# Patient Record
Sex: FEMALE | Race: BLACK OR AFRICAN AMERICAN | NOT HISPANIC OR LATINO | Employment: OTHER | URBAN - METROPOLITAN AREA
[De-identification: names, ages, dates, MRNs, and addresses within clinical notes are randomized per-mention and may not be internally consistent; named-entity substitution may affect disease eponyms.]

---

## 2017-05-04 DIAGNOSIS — Z99.2 ESRD (END STAGE RENAL DISEASE) ON DIALYSIS: Primary | ICD-10-CM

## 2017-05-04 DIAGNOSIS — N18.6 ESRD (END STAGE RENAL DISEASE) ON DIALYSIS: Primary | ICD-10-CM

## 2017-05-10 ENCOUNTER — INITIAL CONSULT (OUTPATIENT)
Dept: VASCULAR SURGERY | Facility: CLINIC | Age: 37
End: 2017-05-10
Payer: MEDICARE

## 2017-05-10 ENCOUNTER — HOSPITAL ENCOUNTER (OUTPATIENT)
Dept: VASCULAR SURGERY | Facility: CLINIC | Age: 37
Discharge: HOME | End: 2017-05-10
Attending: SURGERY
Payer: MEDICARE

## 2017-05-10 VITALS
WEIGHT: 158 LBS | HEIGHT: 67 IN | HEART RATE: 89 BPM | BODY MASS INDEX: 24.8 KG/M2 | DIASTOLIC BLOOD PRESSURE: 77 MMHG | TEMPERATURE: 98 F | SYSTOLIC BLOOD PRESSURE: 107 MMHG

## 2017-05-10 DIAGNOSIS — N18.6 ESRD (END STAGE RENAL DISEASE) ON DIALYSIS: ICD-10-CM

## 2017-05-10 DIAGNOSIS — Z99.2 CKD (CHRONIC KIDNEY DISEASE) STAGE V REQUIRING CHRONIC DIALYSIS: ICD-10-CM

## 2017-05-10 DIAGNOSIS — Z99.2 ESRD (END STAGE RENAL DISEASE) ON DIALYSIS: ICD-10-CM

## 2017-05-10 DIAGNOSIS — N18.6 CKD (CHRONIC KIDNEY DISEASE) STAGE V REQUIRING CHRONIC DIALYSIS: ICD-10-CM

## 2017-05-10 DIAGNOSIS — Z01.818 PRE-OP EVALUATION: Primary | ICD-10-CM

## 2017-05-10 PROCEDURE — 99204 OFFICE O/P NEW MOD 45 MIN: CPT | Mod: S$PBB,,, | Performed by: SURGERY

## 2017-05-10 PROCEDURE — 99999 PR PBB SHADOW E&M-EST. PATIENT-LVL III: CPT | Mod: PBBFAC,,, | Performed by: SURGERY

## 2017-05-10 PROCEDURE — 99213 OFFICE O/P EST LOW 20 MIN: CPT | Mod: PBBFAC | Performed by: SURGERY

## 2017-05-10 PROCEDURE — G0365 VESSEL MAPPING HEMO ACCESS: HCPCS | Mod: 26,S$PBB,, | Performed by: SURGERY

## 2017-05-10 RX ORDER — SODIUM BICARBONATE 650 MG/1
1300 TABLET ORAL 3 TIMES DAILY
Refills: 0 | COMMUNITY
Start: 2017-05-08 | End: 2020-10-28

## 2017-05-10 RX ORDER — SODIUM CHLORIDE 9 MG/ML
INJECTION, SOLUTION INTRAVENOUS CONTINUOUS
Status: CANCELLED | OUTPATIENT
Start: 2017-05-10

## 2017-05-10 RX ORDER — AMLODIPINE BESYLATE 10 MG/1
TABLET ORAL
COMMUNITY
Start: 2017-05-09 | End: 2020-10-28

## 2017-05-10 RX ORDER — CHOLECALCIFEROL (VITAMIN D3) 25 MCG
1000 TABLET ORAL NIGHTLY
COMMUNITY

## 2017-05-10 RX ORDER — CHLORHEXIDINE GLUCONATE ORAL RINSE 1.2 MG/ML
SOLUTION DENTAL
Refills: 0 | COMMUNITY
Start: 2017-05-08 | End: 2020-10-28

## 2017-05-10 RX ORDER — METHIMAZOLE 10 MG/1
TABLET ORAL
Refills: 0 | COMMUNITY
Start: 2017-05-08 | End: 2020-10-28

## 2017-05-10 RX ORDER — FERROUS SULFATE 325(65) MG
325 TABLET, DELAYED RELEASE (ENTERIC COATED) ORAL
COMMUNITY
End: 2020-10-28

## 2017-05-10 NOTE — LETTER
May 10, 2017      Mer Zuniga MD  [address] [place] [address] Blvd  [address]  [place] [address]           [facility] - Vascular Surgery  [address]  [place] [address]  Phone: [phone]  Fax: [phone]          Patient: Agueda Herrera   MR Number: [id number]   YOB: 1980   Date of Visit: 5/10/2017       Dear Dr. Mer Zuniga:    Thank you for referring Agueda Herrera to me for evaluation. Attached you will find relevant portions of my assessment and plan of care.    If you have questions, please do not hesitate to call me. I look forward to following Agueda Herrera along with you.    Sincerely,    Owen Fraser MD    Enclosure  CC:  No Recipients    If you would like to receive this communication electronically, please contact [email or web address] or [phone] to request more information on [employer] access.    For providers and/or their staff who would like to refer a patient to [facility], please contact us through our one-stop-shop provider referral line, [facility], at [phone].    If you feel you have received this communication in error or would no longer like to receive these types of communications, please e-mail [email or web address]

## 2017-05-10 NOTE — PROGRESS NOTES
Agueda Herrera  05/10/2017    HPI:  Patient is a 37 y.o. female with a h/o FSGS, stage V CKD / on HD since June 2013 who is here today for evaluation of AV access.  She's had 3 previous failed accesses.  Attending nursing school  R hand dominant    Has had previous access created at [facility]:  L rc avg, LUE AVG, RUE AVG - Dr Renner/Kierra at [facility]  LUE AVG worked for 3 yrs but required multiple interventions to keep it open  RUE AVG worked for ~ month before it occluded; no thrombectomy done    No MI/stroke  Tobacco use: denies    Renal is Dr Zuniga    Social History     Social History    Marital status: Single     Spouse name: N/A    Number of children: N/A    Years of education: N/A     Occupational History    Not on file.     Social History Main Topics    Smoking status: Not on file    Smokeless tobacco: Not on file    Alcohol use Not on file    Drug use: Not on file    Sexual activity: Not on file     Other Topics Concern    Not on file     Social History Narrative       Current Outpatient Prescriptions:     amlodipine (NORVASC) 10 MG tablet, , Disp: , Rfl:     chlorhexidine (PERIDEX) 0.12 % solution, , Disp: , Rfl: 0    ferrous sulfate 325 (65 FE) MG EC tablet, Take 325 mg by mouth 3 (three) times daily with meals., Disp: , Rfl:     methimazole (TAPAZOLE) 10 MG Tab, , Disp: , Rfl: 0    sodium bicarbonate 650 MG tablet, Take 1,300 mg by mouth 3 (three) times daily., Disp: , Rfl: 0    vitamin D (VITAMIN D3) 1000 units Tab, Take 1,000 Units by mouth once daily., Disp: , Rfl:     REVIEW OF SYSTEMS:  General: negative; ENT: negative; Allergy and Immunology: negative; Hematological and Lymphatic: Negative; Endocrine: negative; Respiratory: no cough, shortness of breath, or wheezing; Cardiovascular: no chest pain or dyspnea on exertion; Gastrointestinal: no abdominal pain/back, change in bowel habits, or bloody stools; Genito-Urinary: no dysuria, trouble voiding, or hematuria; Musculoskeletal:  negative  Neurological: no TIA or stroke symptoms; Psychiatric: no nervousness, anxiety or depression.    PHYSICAL EXAM:   Vitals:    05/10/17 1211   BP: 107/77   Pulse: 89   Temp: 97.7 °F (36.5 °C)     Right Arm BP - Sittin/85 (05/10/17 1215)  Left Arm BP - Sittin/77 (05/10/17 1215)      General appearance:  Alert, well-appearing, and in no distress.  Oriented to person, place, and time   Neurological: Normal speech, no focal findings noted; CN II - XII grossly intact           Musculoskeletal: Digits/nail without cyanosis/clubbing.  Normal muscle strength/tone.                 Neck: Supple, no significant adenopathy; thyroid is not enlarged                  No carotid bruit can be auscultated                Chest:  Clear to auscultation, no wheezes, rales or rhonchi, symmetric air entry     No use of accessory muscles             Cardiac: Normal rate and regular rhythm, S1 and S2 normal; PMI non-displaced          Abdomen: Soft, nontender, nondistended, no masses or organomegaly     No rebound tenderness noted; bowel sounds normal     Pulsatile aortic mass is not palpable.     No groin adenopathy      Extremities:   2+ brachial, 1+ R radial pulse    LAB RESULTS:  No results found for: K, CREATININE  No results found for: WBC, HCT, PLT  No results found for: HGBA1C  IMAGING:  Vein mapping:  R brachial vein: 2.9 - 3.1 mm  L brachial vein:  3.1 - 3.0 mm    Occluded cephalic veins  Basilic veins R 2.2 to 2.3 mm    IMP/PLAN:  37 y.o. female with h/o FSGS, stage V CKD / on HD since 2013 who is here today for evaluation of AV access.  She's had 3 previous failed accesses.  Best option - prior to considering a femoral AVG which she is also not interested in - creation of 1st stage R brachial artery/vein AVF; would need a transposition in 2-3 months later. Check R basilic vein after regional block.  Plan for 17    Owen Fraser MD FACS  Vascular/Endovascular Surgery

## 2017-05-19 ENCOUNTER — TELEPHONE (OUTPATIENT)
Dept: VASCULAR SURGERY | Facility: CLINIC | Age: 37
End: 2017-05-19

## 2017-05-19 NOTE — TELEPHONE ENCOUNTER
Agueda Sharon notified that her surgery is scheduled for 1020am on 5/22/17. she needs to arrive to the 2nd floor DOSC in the hospital @ 830am. she should not eat or drink anything after midnight. Agueda Herrera verbalize understanding.

## 2017-05-22 ENCOUNTER — ANESTHESIA (OUTPATIENT)
Dept: SURGERY | Facility: HOSPITAL | Age: 37
End: 2017-05-22
Payer: MEDICARE

## 2017-05-22 ENCOUNTER — SURGERY (OUTPATIENT)
Age: 37
End: 2017-05-22

## 2017-05-22 ENCOUNTER — ANESTHESIA EVENT (OUTPATIENT)
Dept: SURGERY | Facility: HOSPITAL | Age: 37
End: 2017-05-22
Payer: MEDICARE

## 2017-05-22 ENCOUNTER — HOSPITAL ENCOUNTER (OUTPATIENT)
Facility: HOSPITAL | Age: 37
Discharge: HOME | End: 2017-05-22
Attending: SURGERY | Admitting: SURGERY
Payer: MEDICARE

## 2017-05-22 VITALS
RESPIRATION RATE: 18 BRPM | DIASTOLIC BLOOD PRESSURE: 82 MMHG | TEMPERATURE: 98 F | HEART RATE: 72 BPM | OXYGEN SATURATION: 100 % | WEIGHT: 155.44 LBS | SYSTOLIC BLOOD PRESSURE: 118 MMHG | HEIGHT: 66 IN | BODY MASS INDEX: 24.98 KG/M2

## 2017-05-22 DIAGNOSIS — Z99.2 ESRD (END STAGE RENAL DISEASE) ON DIALYSIS: Primary | ICD-10-CM

## 2017-05-22 DIAGNOSIS — Z99.2 CKD (CHRONIC KIDNEY DISEASE) STAGE V REQUIRING CHRONIC DIALYSIS: ICD-10-CM

## 2017-05-22 DIAGNOSIS — N18.6 ESRD (END STAGE RENAL DISEASE) ON DIALYSIS: Primary | ICD-10-CM

## 2017-05-22 DIAGNOSIS — N18.6 CKD (CHRONIC KIDNEY DISEASE) STAGE V REQUIRING CHRONIC DIALYSIS: ICD-10-CM

## 2017-05-22 PROCEDURE — 25000003 PHARM REV CODE 250: Performed by: STUDENT IN AN ORGANIZED HEALTH CARE EDUCATION/TRAINING PROGRAM

## 2017-05-22 PROCEDURE — 71000044 HC DOSC ROUTINE RECOVERY FIRST HOUR: Performed by: SURGERY

## 2017-05-22 PROCEDURE — 63600175 PHARM REV CODE 636 W HCPCS: Performed by: SURGERY

## 2017-05-22 PROCEDURE — 37000008 HC ANESTHESIA 1ST 15 MINUTES: Performed by: SURGERY

## 2017-05-22 PROCEDURE — 63600175 PHARM REV CODE 636 W HCPCS: Performed by: NURSE ANESTHETIST, CERTIFIED REGISTERED

## 2017-05-22 PROCEDURE — 37000009 HC ANESTHESIA EA ADD 15 MINS: Performed by: SURGERY

## 2017-05-22 PROCEDURE — 71000015 HC POSTOP RECOV 1ST HR: Performed by: SURGERY

## 2017-05-22 PROCEDURE — 27200750 HC INSULATED NEEDLE/ STIMUPLEX: Performed by: ANESTHESIOLOGY

## 2017-05-22 PROCEDURE — D9220A PRA ANESTHESIA: Mod: ANES,,, | Performed by: ANESTHESIOLOGY

## 2017-05-22 PROCEDURE — 27201423 OPTIME MED/SURG SUP & DEVICES STERILE SUPPLY: Performed by: SURGERY

## 2017-05-22 PROCEDURE — D9220A PRA ANESTHESIA: Mod: CRNA,,, | Performed by: NURSE ANESTHETIST, CERTIFIED REGISTERED

## 2017-05-22 PROCEDURE — 25000003 PHARM REV CODE 250: Performed by: SURGERY

## 2017-05-22 PROCEDURE — 25000003 PHARM REV CODE 250: Performed by: NURSE ANESTHETIST, CERTIFIED REGISTERED

## 2017-05-22 PROCEDURE — 36821 AV FUSION DIRECT ANY SITE: CPT | Mod: GC,,, | Performed by: SURGERY

## 2017-05-22 PROCEDURE — 36000706: Performed by: SURGERY

## 2017-05-22 PROCEDURE — 36000707: Performed by: SURGERY

## 2017-05-22 RX ORDER — SODIUM CHLORIDE 0.9 % (FLUSH) 0.9 %
3 SYRINGE (ML) INJECTION
Status: DISCONTINUED | OUTPATIENT
Start: 2017-05-22 | End: 2017-05-22 | Stop reason: HOSPADM

## 2017-05-22 RX ORDER — PAPAVERINE HYDROCHLORIDE 30 MG/ML
INJECTION INTRAMUSCULAR; INTRAVENOUS
Status: DISCONTINUED | OUTPATIENT
Start: 2017-05-22 | End: 2017-05-22 | Stop reason: HOSPADM

## 2017-05-22 RX ORDER — MIDAZOLAM HYDROCHLORIDE 1 MG/ML
INJECTION, SOLUTION INTRAMUSCULAR; INTRAVENOUS
Status: DISCONTINUED | OUTPATIENT
Start: 2017-05-22 | End: 2017-05-22

## 2017-05-22 RX ORDER — HEPARIN SODIUM 1000 [USP'U]/ML
INJECTION, SOLUTION INTRAVENOUS; SUBCUTANEOUS
Status: DISCONTINUED | OUTPATIENT
Start: 2017-05-22 | End: 2017-05-22 | Stop reason: HOSPADM

## 2017-05-22 RX ORDER — HYDROMORPHONE HYDROCHLORIDE 1 MG/ML
0.2 INJECTION, SOLUTION INTRAMUSCULAR; INTRAVENOUS; SUBCUTANEOUS EVERY 5 MIN PRN
Status: DISCONTINUED | OUTPATIENT
Start: 2017-05-22 | End: 2017-05-22 | Stop reason: HOSPADM

## 2017-05-22 RX ORDER — KETAMINE HYDROCHLORIDE 100 MG/ML
INJECTION, SOLUTION INTRAMUSCULAR; INTRAVENOUS
Status: DISCONTINUED | OUTPATIENT
Start: 2017-05-22 | End: 2017-05-22

## 2017-05-22 RX ORDER — PROPOFOL 10 MG/ML
VIAL (ML) INTRAVENOUS
Status: DISCONTINUED | OUTPATIENT
Start: 2017-05-22 | End: 2017-05-22

## 2017-05-22 RX ORDER — ONDANSETRON 2 MG/ML
4 INJECTION INTRAMUSCULAR; INTRAVENOUS ONCE AS NEEDED
Status: DISCONTINUED | OUTPATIENT
Start: 2017-05-22 | End: 2017-05-22 | Stop reason: HOSPADM

## 2017-05-22 RX ORDER — LIDOCAINE HCL/PF 100 MG/5ML
SYRINGE (ML) INTRAVENOUS
Status: DISCONTINUED | OUTPATIENT
Start: 2017-05-22 | End: 2017-05-22

## 2017-05-22 RX ORDER — DIPHENHYDRAMINE HYDROCHLORIDE 50 MG/ML
25 INJECTION INTRAMUSCULAR; INTRAVENOUS EVERY 6 HOURS PRN
Status: DISCONTINUED | OUTPATIENT
Start: 2017-05-22 | End: 2017-05-22 | Stop reason: HOSPADM

## 2017-05-22 RX ORDER — OXYCODONE AND ACETAMINOPHEN 5; 325 MG/1; MG/1
1 TABLET ORAL
Status: DISCONTINUED | OUTPATIENT
Start: 2017-05-22 | End: 2017-05-22 | Stop reason: HOSPADM

## 2017-05-22 RX ORDER — LIDOCAINE HYDROCHLORIDE 10 MG/ML
1 INJECTION, SOLUTION EPIDURAL; INFILTRATION; INTRACAUDAL; PERINEURAL ONCE
Status: DISCONTINUED | OUTPATIENT
Start: 2017-05-22 | End: 2017-05-22 | Stop reason: HOSPADM

## 2017-05-22 RX ORDER — BACITRACIN 50000 [IU]/1
INJECTION, POWDER, FOR SOLUTION INTRAMUSCULAR
Status: DISCONTINUED | OUTPATIENT
Start: 2017-05-22 | End: 2017-05-22 | Stop reason: HOSPADM

## 2017-05-22 RX ORDER — ONDANSETRON 2 MG/ML
4 INJECTION INTRAMUSCULAR; INTRAVENOUS DAILY PRN
Status: DISCONTINUED | OUTPATIENT
Start: 2017-05-22 | End: 2017-05-22 | Stop reason: HOSPADM

## 2017-05-22 RX ORDER — FENTANYL CITRATE 50 UG/ML
INJECTION, SOLUTION INTRAMUSCULAR; INTRAVENOUS
Status: DISCONTINUED | OUTPATIENT
Start: 2017-05-22 | End: 2017-05-22

## 2017-05-22 RX ORDER — PROPOFOL 10 MG/ML
VIAL (ML) INTRAVENOUS CONTINUOUS PRN
Status: DISCONTINUED | OUTPATIENT
Start: 2017-05-22 | End: 2017-05-22

## 2017-05-22 RX ORDER — PHENYLEPHRINE HYDROCHLORIDE 10 MG/ML
INJECTION INTRAVENOUS
Status: DISCONTINUED | OUTPATIENT
Start: 2017-05-22 | End: 2017-05-22

## 2017-05-22 RX ORDER — SODIUM CHLORIDE 9 MG/ML
INJECTION, SOLUTION INTRAVENOUS CONTINUOUS
Status: DISCONTINUED | OUTPATIENT
Start: 2017-05-22 | End: 2017-05-22 | Stop reason: HOSPADM

## 2017-05-22 RX ORDER — OXYCODONE AND ACETAMINOPHEN 5; 325 MG/1; MG/1
1 TABLET ORAL EVERY 4 HOURS PRN
Qty: 45 TABLET | Refills: 0 | Status: SHIPPED | OUTPATIENT
Start: 2017-05-22 | End: 2020-10-28

## 2017-05-22 RX ADMIN — OXYCODONE HYDROCHLORIDE AND ACETAMINOPHEN 1 TABLET: 5; 325 TABLET ORAL at 02:05

## 2017-05-22 RX ADMIN — PROPOFOL 100 MCG/KG/MIN: 10 INJECTION, EMULSION INTRAVENOUS at 10:05

## 2017-05-22 RX ADMIN — LIDOCAINE HYDROCHLORIDE 25 MG: 20 INJECTION, SOLUTION INTRAVENOUS at 10:05

## 2017-05-22 RX ADMIN — KETAMINE HYDROCHLORIDE 30 MG: 100 INJECTION, SOLUTION, CONCENTRATE INTRAMUSCULAR; INTRAVENOUS at 12:05

## 2017-05-22 RX ADMIN — SODIUM CHLORIDE: 0.9 INJECTION, SOLUTION INTRAVENOUS at 09:05

## 2017-05-22 RX ADMIN — VANCOMYCIN HYDROCHLORIDE 1 G: 1 INJECTION, POWDER, LYOPHILIZED, FOR SOLUTION INTRAVENOUS at 10:05

## 2017-05-22 RX ADMIN — PHENYLEPHRINE HYDROCHLORIDE 50 MCG: 10 INJECTION INTRAVENOUS at 11:05

## 2017-05-22 RX ADMIN — PAPAVERINE HYDROCHLORIDE 30 MG: 30 INJECTION, SOLUTION INTRAVENOUS at 11:05

## 2017-05-22 RX ADMIN — FENTANYL CITRATE 50 MCG: 50 INJECTION, SOLUTION INTRAMUSCULAR; INTRAVENOUS at 10:05

## 2017-05-22 RX ADMIN — MIDAZOLAM HYDROCHLORIDE 2 MG: 1 INJECTION, SOLUTION INTRAMUSCULAR; INTRAVENOUS at 10:05

## 2017-05-22 RX ADMIN — PROPOFOL 50 MG: 10 INJECTION, EMULSION INTRAVENOUS at 10:05

## 2017-05-22 RX ADMIN — HEPARIN SODIUM 10000 UNITS: 1000 INJECTION, SOLUTION INTRAVENOUS; SUBCUTANEOUS at 11:05

## 2017-05-22 RX ADMIN — BACITRACIN 50000 UNITS: 50000 INJECTION, POWDER, LYOPHILIZED, FOR SOLUTION INTRAMUSCULAR at 11:05

## 2017-05-22 RX ADMIN — PHENYLEPHRINE HYDROCHLORIDE 100 MCG: 10 INJECTION INTRAVENOUS at 11:05

## 2017-05-22 NOTE — ANESTHESIA PREPROCEDURE EVALUATION
05/22/2017  Agueda Herrera is a 37 y.o., female presenting for right arm AVF.    Anesthesia Evaluation    I have reviewed the Patient Summary Reports.    I have reviewed the Nursing Notes.   I have reviewed the Medications.     Review of Systems  Anesthesia Hx:  No problems with previous Anesthesia  History of prior surgery of interest to airway management or planning: Previous anesthesia: General Denies Family Hx of Anesthesia complications.   Denies Personal Hx of Anesthesia complications.   Social:  Non-Smoker, No Alcohol Use    Cardiovascular:   Exercise tolerance: good Hypertension    Renal/:   Chronic Renal Disease, ESRD, Dialysis        Physical Exam  General:  Well nourished    Airway/Jaw/Neck:  Airway Findings: Mouth Opening: Normal Tongue: Normal  General Airway Assessment: Adult  Mallampati: II  Improves to II with phonation.  TM Distance: Normal, at least 6 cm  Jaw/Neck Findings:  Neck ROM: Normal ROM      Dental:  Dental Findings: In tact   Chest/Lungs:  Chest/Lungs Findings: Clear to auscultation, Normal Respiratory Rate     Heart/Vascular:  Heart Findings: Rate: Normal  Rhythm: Regular Rhythm  Sounds: Normal        Mental Status:  Mental Status Findings:  Cooperative, Alert and Oriented         Anesthesia Plan  Type of Anesthesia, risks & benefits discussed:  Anesthesia Type:  regional, general  Patient's Preference:   Intra-op Monitoring Plan: standard ASA monitors  Intra-op Monitoring Plan Comments:   Post Op Pain Control Plan: peripheral nerve block, multimodal analgesia, IV/PO Opiods PRN and per primary service following discharge from PACU  Post Op Pain Control Plan Comments:   Induction:   IV  Beta Blocker:  Patient is not currently on a Beta-Blocker (No further documentation required).       Informed Consent: Patient understands risks and agrees with Anesthesia plan.   Questions answered. Anesthesia consent signed with patient.  ASA Score: 3     Day of Surgery Review of History & Physical:    H&P update referred to the surgeon.         Ready For Surgery From Anesthesia Perspective.

## 2017-05-22 NOTE — H&P (VIEW-ONLY)
Agueda Herrera  05/10/2017    HPI:  Patient is a 37 y.o. female with a h/o FSGS, stage V CKD / on HD since June 2013 who is here today for evaluation of AV access.  She's had 3 previous failed accesses.  Attending nursing school  R hand dominant    Has had previous access created at [facility]:  L rc avg, LUE AVG, RUE AVG - Dr Renner/Kierra at [facility]  LUE AVG worked for 3 yrs but required multiple interventions to keep it open  RUE AVG worked for ~ month before it occluded; no thrombectomy done    No MI/stroke  Tobacco use: denies    Renal is Dr Zuniga    Social History     Social History    Marital status: Single     Spouse name: N/A    Number of children: N/A    Years of education: N/A     Occupational History    Not on file.     Social History Main Topics    Smoking status: Not on file    Smokeless tobacco: Not on file    Alcohol use Not on file    Drug use: Not on file    Sexual activity: Not on file     Other Topics Concern    Not on file     Social History Narrative       Current Outpatient Prescriptions:     amlodipine (NORVASC) 10 MG tablet, , Disp: , Rfl:     chlorhexidine (PERIDEX) 0.12 % solution, , Disp: , Rfl: 0    ferrous sulfate 325 (65 FE) MG EC tablet, Take 325 mg by mouth 3 (three) times daily with meals., Disp: , Rfl:     methimazole (TAPAZOLE) 10 MG Tab, , Disp: , Rfl: 0    sodium bicarbonate 650 MG tablet, Take 1,300 mg by mouth 3 (three) times daily., Disp: , Rfl: 0    vitamin D (VITAMIN D3) 1000 units Tab, Take 1,000 Units by mouth once daily., Disp: , Rfl:     REVIEW OF SYSTEMS:  General: negative; ENT: negative; Allergy and Immunology: negative; Hematological and Lymphatic: Negative; Endocrine: negative; Respiratory: no cough, shortness of breath, or wheezing; Cardiovascular: no chest pain or dyspnea on exertion; Gastrointestinal: no abdominal pain/back, change in bowel habits, or bloody stools; Genito-Urinary: no dysuria, trouble voiding, or hematuria; Musculoskeletal:  negative  Neurological: no TIA or stroke symptoms; Psychiatric: no nervousness, anxiety or depression.    PHYSICAL EXAM:   Vitals:    05/10/17 1211   BP: 107/77   Pulse: 89   Temp: 97.7 °F (36.5 °C)     Right Arm BP - Sittin/85 (05/10/17 1215)  Left Arm BP - Sittin/77 (05/10/17 1215)      General appearance:  Alert, well-appearing, and in no distress.  Oriented to person, place, and time   Neurological: Normal speech, no focal findings noted; CN II - XII grossly intact           Musculoskeletal: Digits/nail without cyanosis/clubbing.  Normal muscle strength/tone.                 Neck: Supple, no significant adenopathy; thyroid is not enlarged                  No carotid bruit can be auscultated                Chest:  Clear to auscultation, no wheezes, rales or rhonchi, symmetric air entry     No use of accessory muscles             Cardiac: Normal rate and regular rhythm, S1 and S2 normal; PMI non-displaced          Abdomen: Soft, nontender, nondistended, no masses or organomegaly     No rebound tenderness noted; bowel sounds normal     Pulsatile aortic mass is not palpable.     No groin adenopathy      Extremities:   2+ brachial, 1+ R radial pulse    LAB RESULTS:  No results found for: K, CREATININE  No results found for: WBC, HCT, PLT  No results found for: HGBA1C  IMAGING:  Vein mapping:  R brachial vein: 2.9 - 3.1 mm  L brachial vein:  3.1 - 3.0 mm    Occluded cephalic veins  Basilic veins R 2.2 to 2.3 mm    IMP/PLAN:  37 y.o. female with h/o FSGS, stage V CKD / on HD since 2013 who is here today for evaluation of AV access.  She's had 3 previous failed accesses.  Best option - prior to considering a femoral AVG which she is also not interested in - creation of 1st stage R brachial artery/vein AVF; would need a transposition in 2-3 months later. Check R basilic vein after regional block.  Plan for 17    Owen Fraser MD FACS  Vascular/Endovascular Surgery

## 2017-05-22 NOTE — BRIEF OP NOTE
Brief Operative Note  Date: 05/22/2017    Surgeon(s) and Role:     * Owen Fraser MD - Primary     * Rona Hernandez MD - Resident - Assisting    Pre-op Diagnosis:  CKD (chronic kidney disease) stage V requiring chronic dialysis [N18.6, Z99.2]    Post-op Diagnosis:  Same    Procedure(s):  Creation of R BC AVF     Surgeon: Owen Fraser MD FACS    Anesthesia: Local MAC    Findings/Key Components:  Fair thrill in R BC AVF  2+ R radial pulse  *Of note, R cephalic vein was small < 2mm but dilated to > 3 mm after anastomosis; scar from previous RUE AVG    EBL: Minimal      Specimens     None        I attest to being present for the procedure and performing the case.  Owen Fraser MD FACS  Discharge Note  SUMMARY    Admit Date: 5/22/2017    Attending Physician: Owen Fraser MD FACS    Discharge Physician: Owen Fraser MD FACS    Discharge Date: 05/22/2017    Final Diagnosis: CKD (chronic kidney disease) stage V requiring chronic dialysis [N18.6, Z99.2]    Outcome of Treatment: Successful AVF creation    Disposition: Home or self-care    Patient Instructions:   Current Discharge Medication List      CONTINUE these medications which have NOT CHANGED    Details   amlodipine (NORVASC) 10 MG tablet       chlorhexidine (PERIDEX) 0.12 % solution Refills: 0      ferrous sulfate 325 (65 FE) MG EC tablet Take 325 mg by mouth 3 (three) times daily with meals.      methimazole (TAPAZOLE) 10 MG Tab Refills: 0      sodium bicarbonate 650 MG tablet Take 1,300 mg by mouth 3 (three) times daily.  Refills: 0      vitamin D (VITAMIN D3) 1000 units Tab Take 1,000 Units by mouth once daily.             Diet:  Resume pre-operative diet    Activity:  Ad ayaan    Follow-up:  Follow-up in clinic with Dr Fraser within 1-2 weeks; please call clinic nurse at

## 2017-05-22 NOTE — DISCHARGE INSTRUCTIONS
Postoperative Instructions:  - No Blood pressure checks or needle sticks to the Right upper extremity.   - No lifting objects > 15lbs with the Right upper extremity for 3-4 weeks.   - No tylenol or acetaminophen with taking the pain medication prescribed  - No tub baths or swimming pools until your surgical incision is well healed.   - continue home medications    Wound Care:   - Wound care: leave surgical dressing in place until tomorrow- then ok to remove outer dressing, leave steristrips in place & allow these to fall off on their own. Ok to shower and clean wound with soap and water (no tub baths or swimming pools until surgical incision is well healed). Keep wound clean and dry    Follow up:   - f/u with Dr. Fraser in the Vascular surgery clinic in 4 weeks. Will order follow up with imaging to be done prior you appointment.   - patient and family counseled on signs and symptoms of concern. Including but not limited to: nausea, vomiting, dizziness, LOC, sudden onset of motor or sensory deficits, bleeding/bruising/abnormal drainage from or increased pain at the surgical site, fevers, chills, chest pain, SOB. Patient instructed to report to the nearest ED if such symptoms present - voiced understanding.

## 2017-05-22 NOTE — INTERVAL H&P NOTE
The patient has been examined and the H&P has been reviewed:    I concur with the findings and no changes have occurred since H&P was written.    Anesthesia/Surgery risks, benefits and alternative options discussed and understood by patient/family.          Active Hospital Problems    Diagnosis  POA    CKD (chronic kidney disease) stage V requiring chronic dialysis [N18.6, Z99.2]  Not Applicable      Resolved Hospital Problems    Diagnosis Date Resolved POA   No resolved problems to display.

## 2017-05-22 NOTE — ANESTHESIA RELEASE NOTE
"Anesthesia Release from PACU Note    Patient: Agueda Herrera    Procedure(s) Performed: Procedure(s) (LRB):  CREATION-FISTULA-brachial cephalic (Right)    Anesthesia type: regional    Post pain: Adequate analgesia    Post assessment: no apparent anesthetic complications, tolerated procedure well and no evidence of recall    Last Vitals:   Visit Vitals  /87   Pulse 64   Temp 36.4 °C (97.5 °F) (Temporal)   Resp 18   Ht 5' 6" (1.676 m)   Wt 70.5 kg (155 lb 6.8 oz)   LMP 05/08/2017   SpO2 100%   Breastfeeding? No   BMI 25.09 kg/m²       Post vital signs: stable    Level of consciousness: awake, alert  and oriented    Nausea/Vomiting: no nausea/no vomiting    Complications: none    Airway Patency: patent    Respiratory: unassisted    Cardiovascular: stable and blood pressure at baseline    Hydration: euvolemic  "

## 2017-05-22 NOTE — ANESTHESIA POSTPROCEDURE EVALUATION
"Anesthesia Post Evaluation    Patient: Agueda Herrera    Procedure(s) Performed: Procedure(s) (LRB):  CREATION-FISTULA-brachial cephalic (Right)    Final Anesthesia Type: regional  Patient location during evaluation: [facility]  Patient participation: Yes- Able to Participate  Level of consciousness: awake and alert  Post-procedure vital signs: reviewed and stable  Pain management: adequate  Airway patency: patent  PONV status at discharge: No PONV  Anesthetic complications: no      Cardiovascular status: blood pressure returned to baseline  Respiratory status: unassisted  Hydration status: euvolemic  Follow-up not needed.        Visit Vitals  /87   Pulse 64   Temp 36.4 °C (97.5 °F) (Temporal)   Resp 18   Ht 5' 6" (1.676 m)   Wt 70.5 kg (155 lb 6.8 oz)   LMP 05/08/2017   SpO2 100%   Breastfeeding? No   BMI 25.09 kg/m²       Pain/Jeni Score: Pain Assessment Performed: Yes (5/22/2017  1:54 PM)  Presence of Pain: non-verbal indicators absent (5/22/2017  1:54 PM)  Pain Rating Prior to Med Admin: 5 (5/22/2017  2:33 PM)  Jeni Score: 6 (5/22/2017  1:54 PM)      "

## 2017-05-22 NOTE — PROGRESS NOTES
Dr. Morrow aware of weak thrill and no bruit.  She came to bedside and palpated. Said ok. No new orders.

## 2017-05-22 NOTE — OP NOTE
05/22/2017    Indications:     Patient is 37 y.o. with ESRD and need for new long-term dialysis access.    Pre-operative Diagnosis:   ESRD on dialysis in need of dialysis access    Post-operative Diagnosis: Same    Anesthesia: Regional arm block    Operation: Creation of Right BC AVF arm    Surgeon: CORINA Fraser MD [facility]    Assistant: Rona Hernandez MD    Findings:  Fair thrill in R BC AVF  2+ R radial pulse  *Of note, R cephalic vein was small < 2mm but dilated to > 3 mm after anastomosis; scar from previous RUE AVG    EBL: minimal    Antibiotic: 1g iv Vancomycin    After an informed consent was obtained the patient was taken to the operating room and placed in the supine position. Anesthesia proceeded with a successful right arm regional block.   The right arm was prepped and draped in a sterile fashion. A skin incision was made just below the AC fossa and dissection carried down to the cephalic vein as well as the brachial artery. The cephalic vein was < 2 mm but was dilated to >3 mm were able to pass a 3.5 mm dilator.  After dissecting ~ 5-6 cm of cephalic vein which was more tedious due to previous RUE AVG, it was brought into an end-to-side anastomosis between cephalic vein and the brachial artery with 7-O prolene. The anastomosis was slightly redundant causing a kink at the end and decision was made to redo it. Prolene sutures were taken out and distal vein was excised about 5 mm.   Hemostasis was secured. Fair thrill was noted in the graft and the incision was then closed in three layers, subcutaneous tissue with 2-0 Vicryl, deep dermal with 3-0 Vicryl and skin with 4-0 Monocryl. Dermabond was applied. Steril dressing was placed and patient was awaken and transferred to the PACU in good condition.

## 2017-05-22 NOTE — PROGRESS NOTES
Patient complaining of mild nausea after walking to bathroom.  Said eating crackers and drinking sprite helped relieve it.

## 2017-05-22 NOTE — ANESTHESIA PROCEDURE NOTES
Supraclavicular Nerve Single Shot Block    Patient location during procedure: OR    Reason for block: primary anesthetic   Diagnosis: right arm pain, AVF   Start time: 5/22/2017 10:35 AM  Timeout: 5/22/2017 10:35 AM   End time: 5/22/2017 10:45 AM  Staffing  Anesthesiologist: TK SAAB  Other anesthesia staff: GISELL ULLOA  Performed: other anesthesia staff   Preanesthetic Checklist  Completed: patient identified, site marked, surgical consent, pre-op evaluation, timeout performed, IV checked, risks and benefits discussed and monitors and equipment checked  Peripheral Block  Patient position: supine  Prep: ChloraPrep  Patient monitoring: heart rate, cardiac monitor, continuous pulse ox, continuous capnometry and frequent blood pressure checks  Block type: supraclavicular  Laterality: right  Injection technique: single shot  Needle  Needle type: Stimuplex   Needle gauge: 22 G  Needle length: 2 in  Needle localization: anatomical landmarks and ultrasound guidance   -ultrasound image captured on disc.  Assessment  Injection assessment: negative aspiration, negative parasthesia and local visualized surrounding nerve  Paresthesia pain: immediately resolved (no paresthesias with injection, single paresthesia that immediately resolved.)  Heart rate change: no  Slow fractionated injection: yes  Medications:  Bolus administered: 30 mL of 1.5 mepivacaine  Epinephrine added: 3.75 mcg/mL (1/300,000)  Additional Notes  Intercostal brachial field block performed for additional coverage.  VSS, see anesthesia record.  Patient tolerated procedure well.

## 2017-05-22 NOTE — TRANSFER OF CARE
"Anesthesia Transfer of Care Note    Patient: Agueda Herrera    Procedure(s) Performed: Procedure(s) (LRB):  CREATION-FISTULA-brachial cephalic (Right)    Patient location: PACU    Anesthesia Type: general and regional    Transport from OR: Transported from OR on room air with adequate spontaneous ventilation    Post pain: adequate analgesia    Post assessment: no apparent anesthetic complications    Post vital signs: stable    Level of consciousness: awake    Complications: none    Transfer of care protocol was followed      Last vitals:   Visit Vitals  /79 (BP Location: Left arm, Patient Position: Lying, BP Method: Automatic)   Pulse 60   Temp 36.8 °C (98.3 °F) (Oral)   Resp 17   Ht 5' 6" (1.676 m)   Wt 70.5 kg (155 lb 6.8 oz)   LMP 05/08/2017   Breastfeeding? No   BMI 25.09 kg/m²     "

## 2017-05-25 ENCOUNTER — OFFICE VISIT (OUTPATIENT)
Dept: VASCULAR SURGERY | Facility: CLINIC | Age: 37
End: 2017-05-25
Payer: MEDICARE

## 2017-05-25 ENCOUNTER — HOSPITAL ENCOUNTER (OUTPATIENT)
Dept: VASCULAR SURGERY | Facility: CLINIC | Age: 37
Discharge: HOME | End: 2017-05-25
Attending: STUDENT IN AN ORGANIZED HEALTH CARE EDUCATION/TRAINING PROGRAM
Payer: MEDICARE

## 2017-05-25 VITALS
WEIGHT: 156 LBS | DIASTOLIC BLOOD PRESSURE: 85 MMHG | TEMPERATURE: 98 F | BODY MASS INDEX: 24.48 KG/M2 | HEIGHT: 67 IN | HEART RATE: 80 BPM | SYSTOLIC BLOOD PRESSURE: 130 MMHG

## 2017-05-25 DIAGNOSIS — Z99.2 CKD (CHRONIC KIDNEY DISEASE) STAGE V REQUIRING CHRONIC DIALYSIS: Primary | ICD-10-CM

## 2017-05-25 DIAGNOSIS — N18.6 ESRD (END STAGE RENAL DISEASE) ON DIALYSIS: ICD-10-CM

## 2017-05-25 DIAGNOSIS — Z99.2 ESRD (END STAGE RENAL DISEASE) ON DIALYSIS: ICD-10-CM

## 2017-05-25 DIAGNOSIS — Z99.2 ESRD (END STAGE RENAL DISEASE) ON DIALYSIS: Primary | ICD-10-CM

## 2017-05-25 DIAGNOSIS — N18.6 ESRD (END STAGE RENAL DISEASE) ON DIALYSIS: Primary | ICD-10-CM

## 2017-05-25 DIAGNOSIS — N18.6 CKD (CHRONIC KIDNEY DISEASE) STAGE V REQUIRING CHRONIC DIALYSIS: Primary | ICD-10-CM

## 2017-05-25 DIAGNOSIS — T82.9XXA COMPLICATIONS DUE TO RENAL DIALYSIS DEVICE, IMPLANT, AND GRAFT: ICD-10-CM

## 2017-05-25 DIAGNOSIS — I74.9 EMBOLISM AND THROMBOSIS OF ARTERY: ICD-10-CM

## 2017-05-25 PROCEDURE — 99024 POSTOP FOLLOW-UP VISIT: CPT | Mod: ,,, | Performed by: SURGERY

## 2017-05-25 PROCEDURE — 99213 OFFICE O/P EST LOW 20 MIN: CPT | Mod: PBBFAC | Performed by: SURGERY

## 2017-05-25 PROCEDURE — 99999 PR PBB SHADOW E&M-EST. PATIENT-LVL III: CPT | Mod: PBBFAC,,, | Performed by: SURGERY

## 2017-05-25 PROCEDURE — 93990 DOPPLER FLOW TESTING: CPT | Mod: 26,S$PBB,, | Performed by: SURGERY

## 2017-05-25 RX ORDER — ATENOLOL 25 MG/1
TABLET ORAL
COMMUNITY
Start: 2017-02-27 | End: 2020-10-28

## 2017-05-25 NOTE — PROGRESS NOTES
Agueda Herrera  05/25/2017    HPI:  Patient is a 37 y.o. female with a h/o FSGS, stage V CKD / on HD since June 2013 who is here today for evaluation of AV access.  She's had 3 previous failed accesses.  Attending nursing school  R hand dominant    Has had previous access created at [facility]:  L rc avg, LUE AVG, RUE AVG - Dr Renner/Kierra at [facility]  LUE AVG worked for 3 yrs but required multiple interventions to keep it open  RUE AVG worked for ~ month before it occluded; no thrombectomy done    No MI/stroke  Tobacco use: denies    Renal is Dr Zuniga    Social History     Social History    Marital status: Single     Spouse name: N/A    Number of children: N/A    Years of education: N/A     Occupational History    Not on file.     Social History Main Topics    Smoking status: Never Smoker    Smokeless tobacco: Not on file    Alcohol use No    Drug use: No    Sexual activity: Yes     Partners: Male     Other Topics Concern    Not on file     Social History Narrative    No narrative on file       Current Outpatient Prescriptions:     amlodipine (NORVASC) 10 MG tablet, , Disp: , Rfl:     atenolol (TENORMIN) 25 MG tablet, , Disp: , Rfl:     chlorhexidine (PERIDEX) 0.12 % solution, , Disp: , Rfl: 0    ferrous sulfate 325 (65 FE) MG EC tablet, Take 325 mg by mouth 3 (three) times daily with meals., Disp: , Rfl:     methimazole (TAPAZOLE) 10 MG Tab, , Disp: , Rfl: 0    oxycodone-acetaminophen (PERCOCET) 5-325 mg per tablet, Take 1 tablet by mouth every 4 (four) hours as needed., Disp: 45 tablet, Rfl: 0    sodium bicarbonate 650 MG tablet, Take 1,300 mg by mouth 3 (three) times daily., Disp: , Rfl: 0    vitamin D (VITAMIN D3) 1000 units Tab, Take 1,000 Units by mouth once daily., Disp: , Rfl:     REVIEW OF SYSTEMS:  General: negative; ENT: negative; Allergy and Immunology: negative; Hematological and Lymphatic: Negative; Endocrine: negative; Respiratory: no cough, shortness of breath, or wheezing;  Cardiovascular: no chest pain or dyspnea on exertion; Gastrointestinal: no abdominal pain/back, change in bowel habits, or bloody stools; Genito-Urinary: no dysuria, trouble voiding, or hematuria; Musculoskeletal: negative  Neurological: no TIA or stroke symptoms; Psychiatric: no nervousness, anxiety or depression.    PHYSICAL EXAM:   Vitals:    05/25/17 1447   BP: 130/85   Pulse: 80   Temp: 98.3 °F (36.8 °C)            General appearance:  Alert, well-appearing, and in no distress.  Oriented to person, place, and time   Neurological: Normal speech, no focal findings noted; CN II - XII grossly intact           Musculoskeletal: Digits/nail without cyanosis/clubbing.  Normal muscle strength/tone.                 Neck: Supple, no significant adenopathy; thyroid is not enlarged                  No carotid bruit can be auscultated                Chest:  Clear to auscultation, no wheezes, rales or rhonchi, symmetric air entry     No use of accessory muscles             Cardiac: Normal rate and regular rhythm, S1 and S2 normal; PMI non-displaced          Abdomen: Soft, nontender, nondistended, no masses or organomegaly     No rebound tenderness noted; bowel sounds normal     Pulsatile aortic mass is not palpable.     No groin adenopathy      Extremities:   2+ brachial, 1+ R radial pulse    LAB RESULTS:  Lab Results   Component Value Date    K 3.6 05/10/2017    CREATININE 5.3 (H) 05/10/2017     Lab Results   Component Value Date    WBC 7.21 05/10/2017    HCT 33.4 (L) 05/10/2017     05/10/2017     No results found for: HGBA1C  IMAGING:  Vein mapping:  R brachial vein: 2.9 - 3.1 mm  L brachial vein:  3.1 - 3.0 mm    Occluded cephalic veins  Basilic veins R 2.2 to 2.3 mm    IMP/PLAN:  37 y.o. female with h/o FSGS, stage V CKD / on HD since June 2013 who is here today for evaluation of AV access.  She's had 3 previous failed accesses at outside facility - no failed an attempt at R BC AVF.  Best option - prior to  considering a femoral AVG which she is also not interested in - creation of 1st stage R or L brachial (or basilic) artery/vein AVF; would need a transposition in 2-3 months later. Check R basilic vein after regional block.  Fu in 4 weeks w new vein mapping.    Owen Fraser MD FACS  Vascular/Endovascular Surgery

## 2017-06-05 ENCOUNTER — HOSPITAL ENCOUNTER (OUTPATIENT)
Dept: VASCULAR SURGERY | Facility: CLINIC | Age: 37
Discharge: HOME | End: 2017-06-05
Attending: SURGERY
Payer: MEDICARE

## 2017-06-05 ENCOUNTER — OFFICE VISIT (OUTPATIENT)
Dept: VASCULAR SURGERY | Facility: CLINIC | Age: 37
End: 2017-06-05
Payer: MEDICARE

## 2017-06-05 VITALS
WEIGHT: 161.63 LBS | BODY MASS INDEX: 25.37 KG/M2 | HEIGHT: 67 IN | TEMPERATURE: 98 F | SYSTOLIC BLOOD PRESSURE: 118 MMHG | DIASTOLIC BLOOD PRESSURE: 86 MMHG | HEART RATE: 94 BPM

## 2017-06-05 DIAGNOSIS — Z99.2 ESRD (END STAGE RENAL DISEASE) ON DIALYSIS: ICD-10-CM

## 2017-06-05 DIAGNOSIS — N18.6 ESRD (END STAGE RENAL DISEASE) ON DIALYSIS: ICD-10-CM

## 2017-06-05 DIAGNOSIS — Z99.2 ESRD (END STAGE RENAL DISEASE) ON DIALYSIS: Primary | ICD-10-CM

## 2017-06-05 DIAGNOSIS — N18.6 END STAGE RENAL DISEASE: ICD-10-CM

## 2017-06-05 DIAGNOSIS — N18.6 ESRD (END STAGE RENAL DISEASE) ON DIALYSIS: Primary | ICD-10-CM

## 2017-06-05 DIAGNOSIS — M79.603 ARM PAIN: ICD-10-CM

## 2017-06-05 DIAGNOSIS — Z01.818 PREOP EXAMINATION: ICD-10-CM

## 2017-06-05 PROCEDURE — 93922 UPR/L XTREMITY ART 2 LEVELS: CPT | Mod: 26,52,S$PBB, | Performed by: SURGERY

## 2017-06-05 PROCEDURE — 93970 EXTREMITY STUDY: CPT | Mod: 26,S$PBB,, | Performed by: SURGERY

## 2017-06-05 PROCEDURE — 99024 POSTOP FOLLOW-UP VISIT: CPT | Mod: S$PBB,,, | Performed by: SURGERY

## 2017-06-05 PROCEDURE — 99213 OFFICE O/P EST LOW 20 MIN: CPT | Mod: PBBFAC | Performed by: SURGERY

## 2017-06-05 PROCEDURE — 99999 PR PBB SHADOW E&M-EST. PATIENT-LVL III: CPT | Mod: PBBFAC,,, | Performed by: SURGERY

## 2017-06-05 RX ORDER — SODIUM CHLORIDE 9 MG/ML
INJECTION, SOLUTION INTRAVENOUS CONTINUOUS
Status: CANCELLED | OUTPATIENT
Start: 2017-06-05

## 2017-06-05 NOTE — PROGRESS NOTES
Agueda Ordoñez Sharon  06/05/2017    HPI:  Patient is a 37 y.o. female with a h/o FSGS, stage V CKD / on HD since June 2013 who is here today for evaluation of AV access.  She's had 3 previous failed accesses.  Attending nursing school  R hand dominant    Has had previous access created at [facility]:  L rc avg, LUE AVG, RUE AVG - Dr Renner/Kierra at [facility]  LUE AVG worked for 3 yrs but required multiple interventions to keep it open  RUE AVG worked for ~ month before it occluded; no thrombectomy done    No MI/stroke  Tobacco use: denies    Renal is Dr Zuniga    Social History     Social History    Marital status: Single     Spouse name: N/A    Number of children: N/A    Years of education: N/A     Occupational History    Not on file.     Social History Main Topics    Smoking status: Never Smoker    Smokeless tobacco: Not on file    Alcohol use No    Drug use: No    Sexual activity: Yes     Partners: Male     Other Topics Concern    Not on file     Social History Narrative    No narrative on file       Current Outpatient Prescriptions:     amlodipine (NORVASC) 10 MG tablet, , Disp: , Rfl:     atenolol (TENORMIN) 25 MG tablet, , Disp: , Rfl:     chlorhexidine (PERIDEX) 0.12 % solution, , Disp: , Rfl: 0    ferrous sulfate 325 (65 FE) MG EC tablet, Take 325 mg by mouth 3 (three) times daily with meals., Disp: , Rfl:     methimazole (TAPAZOLE) 10 MG Tab, , Disp: , Rfl: 0    oxycodone-acetaminophen (PERCOCET) 5-325 mg per tablet, Take 1 tablet by mouth every 4 (four) hours as needed., Disp: 45 tablet, Rfl: 0    sodium bicarbonate 650 MG tablet, Take 1,300 mg by mouth 3 (three) times daily., Disp: , Rfl: 0    vitamin D (VITAMIN D3) 1000 units Tab, Take 1,000 Units by mouth once daily., Disp: , Rfl:     REVIEW OF SYSTEMS:  General: negative; ENT: negative; Allergy and Immunology: negative; Hematological and Lymphatic: Negative; Endocrine: negative; Respiratory: no cough, shortness of breath, or wheezing;  Cardiovascular: no chest pain or dyspnea on exertion; Gastrointestinal: no abdominal pain/back, change in bowel habits, or bloody stools; Genito-Urinary: no dysuria, trouble voiding, or hematuria; Musculoskeletal: negative  Neurological: no TIA or stroke symptoms; Psychiatric: no nervousness, anxiety or depression.    PHYSICAL EXAM:   Vitals:    06/05/17 1426   BP: 118/86   Pulse: 94   Temp: 98.3 °F (36.8 °C)            General appearance:  Alert, well-appearing, and in no distress.  Oriented to person, place, and time   Neurological: Normal speech, no focal findings noted; CN II - XII grossly intact           Musculoskeletal: Digits/nail without cyanosis/clubbing.  Normal muscle strength/tone.                 Neck: Supple, no significant adenopathy; thyroid is not enlarged                  No carotid bruit can be auscultated                Chest:  Clear to auscultation, no wheezes, rales or rhonchi, symmetric air entry     No use of accessory muscles             Cardiac: Normal rate and regular rhythm, S1 and S2 normal; PMI non-displaced          Abdomen: Soft, nontender, nondistended, no masses or organomegaly     No rebound tenderness noted; bowel sounds normal     Pulsatile aortic mass is not palpable.     No groin adenopathy      Extremities:   2+ brachial, 1+ R radial pulse    LAB RESULTS:  Lab Results   Component Value Date    K 3.6 05/10/2017    CREATININE 5.3 (H) 05/10/2017     Lab Results   Component Value Date    WBC 7.21 05/10/2017    HCT 33.4 (L) 05/10/2017     05/10/2017     No results found for: HGBA1C  IMAGING:  Vein mapping:  R basilic 2.9, 3.4mm  L basilic 3.1, 3.6mm    R brachial vein: 3.4 - 3.5 mm  L brachial vein:  3.6mm    Occluded cephalic veins  Basilic veins R 2.2 to 2.3 mm    IMP/PLAN:  37 y.o. female with h/o FSGS, stage V CKD / on HD since June 2013 who is here today for evaluation of AV access.  She's had 3 previous failed accesses at outside facility - now failed an attempt at R  BC AVF; of note  R cephalic vein was scarred   Best option - prior to considering a femoral AVG which she is also not interested in - creation of 1st stage  L brachial (or basilic) artery/vein AVF; would need a transposition in 2-3 months later.   Avoid periods of hypotension    Owen Fraser MD FACS  Vascular/Endovascular Surgery

## 2017-06-08 ENCOUNTER — TELEPHONE (OUTPATIENT)
Dept: VASCULAR SURGERY | Facility: CLINIC | Age: 37
End: 2017-06-08

## 2017-06-08 ENCOUNTER — ANESTHESIA EVENT (OUTPATIENT)
Dept: SURGERY | Facility: HOSPITAL | Age: 37
End: 2017-06-08
Payer: MEDICARE

## 2017-06-09 ENCOUNTER — SURGERY (OUTPATIENT)
Age: 37
End: 2017-06-09

## 2017-06-09 ENCOUNTER — ANESTHESIA (OUTPATIENT)
Dept: SURGERY | Facility: HOSPITAL | Age: 37
End: 2017-06-09
Payer: MEDICARE

## 2017-06-09 ENCOUNTER — HOSPITAL ENCOUNTER (OUTPATIENT)
Facility: HOSPITAL | Age: 37
Discharge: HOME | End: 2017-06-09
Attending: SURGERY | Admitting: SURGERY
Payer: MEDICARE

## 2017-06-09 VITALS
HEART RATE: 79 BPM | TEMPERATURE: 98 F | WEIGHT: 161 LBS | BODY MASS INDEX: 25.88 KG/M2 | RESPIRATION RATE: 18 BRPM | SYSTOLIC BLOOD PRESSURE: 127 MMHG | HEIGHT: 66 IN | OXYGEN SATURATION: 100 % | DIASTOLIC BLOOD PRESSURE: 86 MMHG

## 2017-06-09 DIAGNOSIS — Z99.2 ESRD (END STAGE RENAL DISEASE) ON DIALYSIS: ICD-10-CM

## 2017-06-09 DIAGNOSIS — N18.6 ESRD (END STAGE RENAL DISEASE) ON DIALYSIS: ICD-10-CM

## 2017-06-09 LAB
B-HCG UR QL: NEGATIVE
CTP QC/QA: YES

## 2017-06-09 PROCEDURE — 63600175 PHARM REV CODE 636 W HCPCS: Performed by: SURGERY

## 2017-06-09 PROCEDURE — 25000003 PHARM REV CODE 250: Performed by: NURSE ANESTHETIST, CERTIFIED REGISTERED

## 2017-06-09 PROCEDURE — 27201423 OPTIME MED/SURG SUP & DEVICES STERILE SUPPLY: Performed by: SURGERY

## 2017-06-09 PROCEDURE — 81025 URINE PREGNANCY TEST: CPT | Performed by: SURGERY

## 2017-06-09 PROCEDURE — 63600175 PHARM REV CODE 636 W HCPCS: Performed by: NURSE ANESTHETIST, CERTIFIED REGISTERED

## 2017-06-09 PROCEDURE — 71000044 HC DOSC ROUTINE RECOVERY FIRST HOUR: Performed by: SURGERY

## 2017-06-09 PROCEDURE — 71000015 HC POSTOP RECOV 1ST HR: Performed by: SURGERY

## 2017-06-09 PROCEDURE — 25000003 PHARM REV CODE 250: Performed by: ANESTHESIOLOGY

## 2017-06-09 PROCEDURE — 25000003 PHARM REV CODE 250: Performed by: SURGERY

## 2017-06-09 PROCEDURE — 37000008 HC ANESTHESIA 1ST 15 MINUTES: Performed by: SURGERY

## 2017-06-09 PROCEDURE — 37000009 HC ANESTHESIA EA ADD 15 MINS: Performed by: SURGERY

## 2017-06-09 PROCEDURE — 27200750 HC INSULATED NEEDLE/ STIMUPLEX: Performed by: ANESTHESIOLOGY

## 2017-06-09 PROCEDURE — 36000706: Performed by: SURGERY

## 2017-06-09 PROCEDURE — 36821 AV FUSION DIRECT ANY SITE: CPT | Mod: 79,GC,, | Performed by: SURGERY

## 2017-06-09 PROCEDURE — 25000003 PHARM REV CODE 250: Performed by: STUDENT IN AN ORGANIZED HEALTH CARE EDUCATION/TRAINING PROGRAM

## 2017-06-09 PROCEDURE — D9220A PRA ANESTHESIA: Mod: CRNA,,, | Performed by: NURSE ANESTHETIST, CERTIFIED REGISTERED

## 2017-06-09 PROCEDURE — 36000707: Performed by: SURGERY

## 2017-06-09 PROCEDURE — D9220A PRA ANESTHESIA: Mod: ANES,,, | Performed by: ANESTHESIOLOGY

## 2017-06-09 PROCEDURE — 71000045 HC DOSC ROUTINE RECOVERY EA ADD'L HR: Performed by: SURGERY

## 2017-06-09 RX ORDER — PROPOFOL 10 MG/ML
VIAL (ML) INTRAVENOUS CONTINUOUS PRN
Status: DISCONTINUED | OUTPATIENT
Start: 2017-06-09 | End: 2017-06-09

## 2017-06-09 RX ORDER — HYDROCODONE BITARTRATE AND ACETAMINOPHEN 5; 325 MG/1; MG/1
1 TABLET ORAL EVERY 6 HOURS PRN
Qty: 20 TABLET | Refills: 0 | Status: SHIPPED | OUTPATIENT
Start: 2017-06-09 | End: 2020-10-28

## 2017-06-09 RX ORDER — BACITRACIN 50000 [IU]/1
INJECTION, POWDER, FOR SOLUTION INTRAMUSCULAR
Status: DISCONTINUED | OUTPATIENT
Start: 2017-06-09 | End: 2017-06-09 | Stop reason: HOSPADM

## 2017-06-09 RX ORDER — MIDAZOLAM HYDROCHLORIDE 1 MG/ML
INJECTION, SOLUTION INTRAMUSCULAR; INTRAVENOUS
Status: DISCONTINUED | OUTPATIENT
Start: 2017-06-09 | End: 2017-06-09

## 2017-06-09 RX ORDER — ONDANSETRON 8 MG/1
8 TABLET, ORALLY DISINTEGRATING ORAL ONCE
Status: COMPLETED | OUTPATIENT
Start: 2017-06-09 | End: 2017-06-09

## 2017-06-09 RX ORDER — SODIUM CHLORIDE 9 MG/ML
INJECTION, SOLUTION INTRAVENOUS CONTINUOUS PRN
Status: DISCONTINUED | OUTPATIENT
Start: 2017-06-09 | End: 2017-06-09

## 2017-06-09 RX ORDER — SODIUM CHLORIDE 9 MG/ML
INJECTION, SOLUTION INTRAVENOUS CONTINUOUS
Status: DISCONTINUED | OUTPATIENT
Start: 2017-06-09 | End: 2017-06-09

## 2017-06-09 RX ORDER — HYDROCODONE BITARTRATE AND ACETAMINOPHEN 5; 325 MG/1; MG/1
1 TABLET ORAL ONCE
Status: COMPLETED | OUTPATIENT
Start: 2017-06-09 | End: 2017-06-09

## 2017-06-09 RX ORDER — HEPARIN SODIUM 1000 [USP'U]/ML
INJECTION, SOLUTION INTRAVENOUS; SUBCUTANEOUS
Status: DISCONTINUED | OUTPATIENT
Start: 2017-06-09 | End: 2017-06-09 | Stop reason: HOSPADM

## 2017-06-09 RX ORDER — FENTANYL CITRATE 50 UG/ML
INJECTION, SOLUTION INTRAMUSCULAR; INTRAVENOUS
Status: DISCONTINUED | OUTPATIENT
Start: 2017-06-09 | End: 2017-06-09

## 2017-06-09 RX ORDER — LIDOCAINE HYDROCHLORIDE 20 MG/ML
INJECTION, SOLUTION EPIDURAL; INFILTRATION; INTRACAUDAL; PERINEURAL
Status: DISCONTINUED | OUTPATIENT
Start: 2017-06-09 | End: 2017-06-09

## 2017-06-09 RX ORDER — PAPAVERINE HYDROCHLORIDE 30 MG/ML
INJECTION INTRAMUSCULAR; INTRAVENOUS
Status: DISCONTINUED | OUTPATIENT
Start: 2017-06-09 | End: 2017-06-09 | Stop reason: HOSPADM

## 2017-06-09 RX ADMIN — FENTANYL CITRATE 25 MCG: 50 INJECTION, SOLUTION INTRAMUSCULAR; INTRAVENOUS at 07:06

## 2017-06-09 RX ADMIN — PROPOFOL 40 MCG/KG/MIN: 10 INJECTION, EMULSION INTRAVENOUS at 07:06

## 2017-06-09 RX ADMIN — PAPAVERINE HYDROCHLORIDE 60 MG: 30 INJECTION, SOLUTION INTRAVENOUS at 08:06

## 2017-06-09 RX ADMIN — LIDOCAINE HYDROCHLORIDE 80 MG: 20 INJECTION, SOLUTION EPIDURAL; INFILTRATION; INTRACAUDAL; PERINEURAL at 07:06

## 2017-06-09 RX ADMIN — SODIUM CHLORIDE: 0.9 INJECTION, SOLUTION INTRAVENOUS at 06:06

## 2017-06-09 RX ADMIN — MIDAZOLAM HYDROCHLORIDE 4 MG: 1 INJECTION, SOLUTION INTRAMUSCULAR; INTRAVENOUS at 06:06

## 2017-06-09 RX ADMIN — BACITRACIN 50000 UNITS: 50000 INJECTION, POWDER, LYOPHILIZED, FOR SOLUTION INTRAMUSCULAR at 07:06

## 2017-06-09 RX ADMIN — HYDROCODONE BITARTRATE AND ACETAMINOPHEN 1 TABLET: 5; 325 TABLET ORAL at 12:06

## 2017-06-09 RX ADMIN — ONDANSETRON 8 MG: 8 TABLET, ORALLY DISINTEGRATING ORAL at 01:06

## 2017-06-09 RX ADMIN — FENTANYL CITRATE 25 MCG: 50 INJECTION, SOLUTION INTRAMUSCULAR; INTRAVENOUS at 09:06

## 2017-06-09 RX ADMIN — FENTANYL CITRATE 25 MCG: 50 INJECTION, SOLUTION INTRAMUSCULAR; INTRAVENOUS at 10:06

## 2017-06-09 RX ADMIN — HEPARIN SODIUM 10000 UNITS: 1000 INJECTION, SOLUTION INTRAVENOUS; SUBCUTANEOUS at 07:06

## 2017-06-09 RX ADMIN — VANCOMYCIN HYDROCHLORIDE 1000 MG: 1 INJECTION, POWDER, LYOPHILIZED, FOR SOLUTION INTRAVENOUS at 06:06

## 2017-06-09 NOTE — TRANSFER OF CARE
"Anesthesia Transfer of Care Note    Patient: Agueda Herrera    Procedure(s) Performed: Procedure(s) (LRB):  Creation of 1st stage L basilic / brachial AVF (Left)    Patient location: [facility]    Anesthesia Type: regional    Transport from OR: Transported from OR on room air with adequate spontaneous ventilation    Post pain: adequate analgesia    Post assessment: no apparent anesthetic complications and tolerated procedure well    Post vital signs: stable    Level of consciousness: sedated    Nausea/Vomiting: no nausea/vomiting    Complications: none    Transfer of care protocol was followed      Last vitals:   Visit Vitals  /66   Pulse 99   Temp 36.7 °C (98.1 °F) (Oral)   Resp 20   Ht 5' 6" (1.676 m)   Wt 73 kg (161 lb)   LMP 06/08/2017   SpO2 99%   BMI 25.99 kg/m²     "

## 2017-06-09 NOTE — PLAN OF CARE
Patient states she does not want nerve block and has communicated with resident. Patient encouraged to send valuables with family and refused and asked that purse be locked in locker.

## 2017-06-09 NOTE — ANESTHESIA PREPROCEDURE EVALUATION
06/09/2017  Agueda Herrera is a 37 y.o., female.    Anesthesia Evaluation    I have reviewed the Patient Summary Reports.    I have reviewed the Nursing Notes.   I have reviewed the Medications.     Review of Systems  Anesthesia Hx:  No problems with previous Anesthesia    Cardiovascular:   Hypertension Denies MI.      Pulmonary:  Pulmonary Normal  Denies Asthma.  Denies Shortness of breath.  Denies Sleep Apnea.    Renal/:   Chronic Renal Disease, ESRD, Dialysis Dialyzed T, Th, Sat.  Last Dialyzed yesterday   Hepatic/GI:  Hepatic/GI Normal  Denies GERD. Denies Liver Disease.    Neurological:  Neurology Normal Denies TIA.  Denies CVA. Denies Seizures.    Endocrine:   Denies Diabetes. Denies Hypothyroidism. Hyperthyroidism        Physical Exam  General:  Well nourished    Airway/Jaw/Neck:  Airway Findings: Mouth Opening: Normal Tongue: Normal  General Airway Assessment: Adult  Mallampati: II  TM Distance: Normal, at least 6 cm  Jaw/Neck Findings:  Neck ROM: Normal ROM      Dental:  Dental Findings: In tact        Mental Status:  Mental Status Findings:  Alert and Oriented         Anesthesia Plan  Type of Anesthesia, risks & benefits discussed:  Anesthesia Type:  general, MAC, regional  Patient's Preference:   Intra-op Monitoring Plan:   Intra-op Monitoring Plan Comments:   Post Op Pain Control Plan: multimodal analgesia  Post Op Pain Control Plan Comments:   Induction:   IV  Beta Blocker:  Patient is not currently on a Beta-Blocker (No further documentation required).       Informed Consent: Patient understands risks and agrees with Anesthesia plan.  Questions answered. Anesthesia consent signed with patient.  ASA Score: 4     Day of Surgery Review of History & Physical:    H&P update referred to the surgeon.     Anesthesia Plan Notes: Upon initial discussion with the patient, she declined regional  block for procedure due to ongoing paresthesia in right arm after last surgery which she attributes to the block.    Surgeon discussed necessity of block with patient.  Surgeon felt block was necessary for case in order to provide vasodilatation to vessels.  Surgeon told patient case would not be able to be performed without regional block.    Patient agreed to block as she needs functional fistula for dialysis.          Ready For Surgery From Anesthesia Perspective.

## 2017-06-09 NOTE — H&P (VIEW-ONLY)
Agueda Ordoñez Sharon  06/05/2017    HPI:  Patient is a 37 y.o. female with a h/o FSGS, stage V CKD / on HD since June 2013 who is here today for evaluation of AV access.  She's had 3 previous failed accesses.  Attending nursing school  R hand dominant    Has had previous access created at [facility]:  L rc avg, LUE AVG, RUE AVG - Dr Renner/Kierra at [facility]  LUE AVG worked for 3 yrs but required multiple interventions to keep it open  RUE AVG worked for ~ month before it occluded; no thrombectomy done    No MI/stroke  Tobacco use: denies    Renal is Dr Zuniga    Social History     Social History    Marital status: Single     Spouse name: N/A    Number of children: N/A    Years of education: N/A     Occupational History    Not on file.     Social History Main Topics    Smoking status: Never Smoker    Smokeless tobacco: Not on file    Alcohol use No    Drug use: No    Sexual activity: Yes     Partners: Male     Other Topics Concern    Not on file     Social History Narrative    No narrative on file       Current Outpatient Prescriptions:     amlodipine (NORVASC) 10 MG tablet, , Disp: , Rfl:     atenolol (TENORMIN) 25 MG tablet, , Disp: , Rfl:     chlorhexidine (PERIDEX) 0.12 % solution, , Disp: , Rfl: 0    ferrous sulfate 325 (65 FE) MG EC tablet, Take 325 mg by mouth 3 (three) times daily with meals., Disp: , Rfl:     methimazole (TAPAZOLE) 10 MG Tab, , Disp: , Rfl: 0    oxycodone-acetaminophen (PERCOCET) 5-325 mg per tablet, Take 1 tablet by mouth every 4 (four) hours as needed., Disp: 45 tablet, Rfl: 0    sodium bicarbonate 650 MG tablet, Take 1,300 mg by mouth 3 (three) times daily., Disp: , Rfl: 0    vitamin D (VITAMIN D3) 1000 units Tab, Take 1,000 Units by mouth once daily., Disp: , Rfl:     REVIEW OF SYSTEMS:  General: negative; ENT: negative; Allergy and Immunology: negative; Hematological and Lymphatic: Negative; Endocrine: negative; Respiratory: no cough, shortness of breath, or wheezing;  Cardiovascular: no chest pain or dyspnea on exertion; Gastrointestinal: no abdominal pain/back, change in bowel habits, or bloody stools; Genito-Urinary: no dysuria, trouble voiding, or hematuria; Musculoskeletal: negative  Neurological: no TIA or stroke symptoms; Psychiatric: no nervousness, anxiety or depression.    PHYSICAL EXAM:   Vitals:    06/05/17 1426   BP: 118/86   Pulse: 94   Temp: 98.3 °F (36.8 °C)            General appearance:  Alert, well-appearing, and in no distress.  Oriented to person, place, and time   Neurological: Normal speech, no focal findings noted; CN II - XII grossly intact           Musculoskeletal: Digits/nail without cyanosis/clubbing.  Normal muscle strength/tone.                 Neck: Supple, no significant adenopathy; thyroid is not enlarged                  No carotid bruit can be auscultated                Chest:  Clear to auscultation, no wheezes, rales or rhonchi, symmetric air entry     No use of accessory muscles             Cardiac: Normal rate and regular rhythm, S1 and S2 normal; PMI non-displaced          Abdomen: Soft, nontender, nondistended, no masses or organomegaly     No rebound tenderness noted; bowel sounds normal     Pulsatile aortic mass is not palpable.     No groin adenopathy      Extremities:   2+ brachial, 1+ R radial pulse    LAB RESULTS:  Lab Results   Component Value Date    K 3.6 05/10/2017    CREATININE 5.3 (H) 05/10/2017     Lab Results   Component Value Date    WBC 7.21 05/10/2017    HCT 33.4 (L) 05/10/2017     05/10/2017     No results found for: HGBA1C  IMAGING:  Vein mapping:  R basilic 2.9, 3.4mm  L basilic 3.1, 3.6mm    R brachial vein: 3.4 - 3.5 mm  L brachial vein:  3.6mm    Occluded cephalic veins  Basilic veins R 2.2 to 2.3 mm    IMP/PLAN:  37 y.o. female with h/o FSGS, stage V CKD / on HD since June 2013 who is here today for evaluation of AV access.  She's had 3 previous failed accesses at outside facility - now failed an attempt at R  BC AVF; of note  R cephalic vein was scarred   Best option - prior to considering a femoral AVG which she is also not interested in - creation of 1st stage  L brachial (or basilic) artery/vein AVF; would need a transposition in 2-3 months later.   Avoid periods of hypotension    Owen Fraser MD FACS  Vascular/Endovascular Surgery

## 2017-06-09 NOTE — ANESTHESIA POSTPROCEDURE EVALUATION
"Anesthesia Post Evaluation    Patient: Agueda Herrera    Procedure(s) Performed: Procedure(s) (LRB):  Creation of 1st stage L basilic / brachial AVF (Left)    Final Anesthesia Type: regional  Patient location during evaluation: PACU  Patient participation: Yes- Able to Participate  Level of consciousness: awake  Post-procedure vital signs: reviewed and stable  Pain management: adequate  Airway patency: patent  PONV status at discharge: No PONV  Anesthetic complications: no      Cardiovascular status: blood pressure returned to baseline  Respiratory status: unassisted  Hydration status: euvolemic  Follow-up not needed.        Visit Vitals  /88   Pulse 81   Temp 36.5 °C (97.7 °F) (Skin)   Resp 18   Ht 5' 6" (1.676 m)   Wt 73 kg (161 lb)   LMP 06/08/2017   SpO2 100%   BMI 25.99 kg/m²       Pain/Jeni Score: Pain Assessment Performed: Yes (6/9/2017 11:41 AM)  Presence of Pain: complains of pain/discomfort (6/9/2017 11:41 AM)  Pain Rating Prior to Med Admin: 10 (6/9/2017 12:17 PM)  Pain Rating Post Med Admin: 7 (6/9/2017  1:00 PM)  Jeni Score: 6 (6/9/2017 11:10 AM)      "

## 2017-06-09 NOTE — BRIEF OP NOTE
Brief Operative Note  Date: 06/09/2017    Surgeon(s) and Role:     * Owen Fraser MD - Primary     * Tarun Alanis MD - Resident - Assisting    Pre-op Diagnosis:  ESRD (end stage renal disease) on dialysis [N18.6, Z99.2]    Post-op Diagnosis:  Same    Procedure(s):  Creation of 1st stage L basilic vein / brachial AVF    Surgeon: Owen Fraser MD FACS    Anesthesia: Regional    Findings/Key Components:  L basilic vein mobilized for ~8 cm and a small distal L basilic vein dilated to ~3 mm; anastomosis done w 7-O prolene.  Strong thrill in L brachial artery/basilic vein AVF  Scarred operative due to previous LUE AVG  2+ L radial pulse    EBL: Minimal      Specimens     None        I attest to being present for the procedure and performing the case.  Owen Fraser MD [facility]  Discharge Note  SUMMARY    Admit Date: 6/9/2017    Attending Physician: Owen Fraser MD FACS    Discharge Physician: Owen Fraser MD [facility]    Discharge Date: 06/09/2017    Final Diagnosis: ESRD (end stage renal disease) on dialysis [N18.6, Z99.2]    Outcome of Treatment: Successful AVF creation    Disposition: Home or self-care    Patient Instructions:   Current Discharge Medication List      CONTINUE these medications which have NOT CHANGED    Details   amlodipine (NORVASC) 10 MG tablet       atenolol (TENORMIN) 25 MG tablet       chlorhexidine (PERIDEX) 0.12 % solution Refills: 0      ferrous sulfate 325 (65 FE) MG EC tablet Take 325 mg by mouth 3 (three) times daily with meals.      methimazole (TAPAZOLE) 10 MG Tab Refills: 0      sodium bicarbonate 650 MG tablet Take 1,300 mg by mouth 3 (three) times daily.  Refills: 0      vitamin D (VITAMIN D3) 1000 units Tab Take 1,000 Units by mouth once daily.      oxycodone-acetaminophen (PERCOCET) 5-325 mg per tablet Take 1 tablet by mouth every 4 (four) hours as needed.  Qty: 45 tablet, Refills: 0             Diet:  Resume pre-operative diet    Activity:  Ad ayaan    Follow-up:  Follow-up in  clinic with Dr Fraser within 1-2 weeks; please call clinic nurse at

## 2017-06-09 NOTE — DISCHARGE INSTRUCTIONS
Arteriovenous (AV) Fistula for Dialysis  An AV fistula is a connection between an artery and a vein. For this procedure, an AV fistula is surgically created using an artery and a vein in your arm. (Your doctor will let you know if another site is to be used.) When the artery and vein are joined, blood flow increases from the artery into the vein. As a result, the vein enlarges over time. The enlarged vein provides easier access to the blood for dialysis (a treatment for kidney failure). This sheet explains the procedure and what to expect.     An AV fistula increases blood flow from the artery into the vein. Over time, the vein becomes stronger and enlarged.   Preparing for the Procedure  Prepare as you have been told. In addition:  · Tell your doctor about all medications you take. This includes over-the-counter drugs. It also includes herbs and other supplements. You may need to stop taking some or all of them before the procedure.  · Follow any directions youre given for not eating or drinking before the procedure.  · Do not allow anyone to draw blood from or take blood pressure on the arm that will have the fistula prior to the procedure.  The Day of the Procedure  The procedure takes about 1-2 hours. Youll likely go home the same day.  Before the procedure begins:  · An IV line is put into a vein in the arm or hand not being used for the procedure. This line supplies fluids and medications.  · To keep you free of pain during the procedure, youre given general anesthesia. This medication puts you into a state like deep sleep through the procedure. Or a nerve block may be used. This medication numbs the arm. With it, you may also be given medication that makes you relaxed and drowsy through the procedure.  During the procedure:  · The skin over your arm may be injected with numbing medication.  · One or more small incisions are then made through the numbed skin. This depends on the size of your arm and the  depth of the vein in your arm.  · The vein is attached to the selected artery.  · Any incisions made are then closed with sutures (stitches), staples, surgical glue, or strips of surgical tape.  After the procedure:  · Youll be asked to keep your arm raised (elevated) as often as possible for at least a week after the procedure.  · Youll be given medications to manage pain as needed.  · Your arm and hand will be checked to make sure blood is flowing through the fistula properly. The feeling of blood rushing through the fistula is called a thrill. It is somewhat similar to the purring of a cat. Youll be taught how to check for this feeling each day to make sure there are no problems with your fistula. Youll also be taught how to care for your fistula at home.  · When its time for you to leave the hospital, have an adult family member or friend ready to drive you home.  Recovering at Home  Once at home, follow all of the instructions youve been given. Be sure to:  · Take all medications as directed.  · Care for your incision as instructed.  · Check for signs of infection at the incision site (see below).  · Avoid heavy lifting and strenuous activities as directed.  · Monitor and care for your fistula as instructed.  · Do your hand and arm exercises as instructed. This usually involves squeezing a ball in your hand for a few minutes each hour.  Call Your Health Care Provider If You Have Any of the Following:  · Fever of 100.4°F or higher  · Signs of infection at the incision site, such as increased redness or swelling, warmth, worsening pain, bleeding, or foul-smelling drainage  · Lack of a thrill (you cant feel it)  · Pain or numbness in your fingers, hand, or arm  · Bleeding, redness, or warmth around your fistula  · Sudden bulging of the fistula (more than usual; a slight bulge is normal)   Follow-Up  Your health care provider will check your fistula within 1 to 2 weeks after the procedure. It will likely  take about 6 to 8 weeks for the fistula to enlarge enough to start dialysis. After that, make sure the fistula is checked each time you have dialysis. Your health care provider may also suggest checkups every 6 months.  Risks and Possible Complications Include:  · Failure of the fistula to work properly  · Long wait before the fistula is ready (up to 6 months)  · Coldness or numbness in the hand (due to blood flowing away from the hand and into the fistula)  · An unsightly bump under the skin (due to enlargement of the fistula)  · Prolonged bleeding from the fistula after dialysis  · Narrowing or weakening of the blood vessels used for the fistula  · Formation of blood clots in the blood vessels used for the fistula  · Risks of anesthesia or any other medications used during the procedure   Living with an AV Fistula  A problem, such as narrowing of the vein (stricture) or infection, can make the fistula unusable. If this happens, you may need other treatments to repair or make a new fistula. To protect your fistula, follow these and any other guidelines youre given:  · Check your fistula as often as your health care provider says. If you cant feel your thrill, let he or she know right away.  · Make sure your fistula is checked before each dialysis treatment.  · Dont let anyone draw blood from or take blood pressure on the arm that has the fistula.  · Wash your hands often and keep the area around your fistula clean.  · Dont sleep on the arm that has the fistula.  · Dont wear tight jewelry or a watch on the arm with your fistula.  · Protect your fistula from cuts, scrapes, or blows.  Date Last Reviewed: 11/26/2014  © 0803-2099 [employer]. [address] [place] [address], [place] [address]. All rights reserved. This information is not intended as a substitute for professional medical care. Always follow your healthcare professional's instructions.

## 2017-06-09 NOTE — ANESTHESIA RELEASE NOTE
"Anesthesia Release from PACU Note    Patient: Agueda Herrera    Procedure(s) Performed: Procedure(s) (LRB):  Creation of 1st stage L basilic / brachial AVF (Left)    Anesthesia type: regional    Post pain: Adequate analgesia    Post assessment: no apparent anesthetic complications    Last Vitals:   Visit Vitals  /88   Pulse 81   Temp 36.5 °C (97.7 °F) (Skin)   Resp 18   Ht 5' 6" (1.676 m)   Wt 73 kg (161 lb)   LMP 06/08/2017   SpO2 100%   BMI 25.99 kg/m²       Post vital signs: stable    Level of consciousness: awake    Nausea/Vomiting: no nausea/no vomiting    Complications: none    Airway Patency: patent    Respiratory: unassisted    Cardiovascular: stable and blood pressure at baseline    Hydration: euvolemic  "

## 2017-06-09 NOTE — PROGRESS NOTES
Dr. Solorzano returned page. Notified of pt. C/o pain. states she will put pain medication orders in now.

## 2017-06-09 NOTE — OP NOTE
Date: 06/09/2017    Surgeon(s) and Role:     * Owen Fraser MD - Primary     * Tarun Alanis MD - Resident - Assisting    Pre-op Diagnosis:  ESRD (end stage renal disease) on dialysis [N18.6, Z99.2]    Post-op Diagnosis:  Same    Procedure(s):  Creation of 1st stage L basilic vein / brachial AVF    Surgeon: Owen Fraser MD [facility]    Anesthesia: Regional    Findings/Key Components:  L basilic vein mobilized for ~8 cm and a small distal L basilic vein dilated to ~3 mm; anastomosis done w 7-O prolene.  Strong thrill in L brachial artery/basilic vein AVF  Scarred operative due to previous LUE AVG  2+ L radial pulse    EBL: minimal    Antibiotic: 1g iv Vancomycin    After an informed consent was obtained the patient was taken to the operating room and placed in the supine position.   The L arm was prepped and draped in a sterile fashion. A skin incision was made just above the AC fossa and the L basilic vein was dissected for a segment of ~ 8 cm; the larger more medial branch was dissected and vein marked for orientation.  Of note, this was a heavily scarred operative due to previous LUE AVG.  Next, the dissection was carried down to the brachial artery.  After gentle dilatation with heparinized saline, the L basilic was~ 3 mm; the L basilic vein, it was brought into an end-to-side anastomosis between Lbasilic vein and the Lbrachial artery with 7-O prolene.   Hemostasis was secured. Strong thrill was noted in the graft and the incision was then closed in two layers, subcutaneous tissue with 2-O and 3-0 vicryl and skin with 3-0 nylon sutures.  She will need a future 2nd stage / transposition of the L basilic vein AVF in ~3-4 months.

## 2017-06-09 NOTE — PLAN OF CARE
Discharge instructions reviewed with patient and friend. Verbalizes understanding. Copies of instructions given to patient. Tolerating po fluids. Denies nausea. Safety maintained.

## 2017-06-09 NOTE — ANESTHESIA PROCEDURE NOTES
Supraclavicular    Patient location during procedure: OR    Reason for block: primary anesthetic   Diagnosis: esrd   Start time: 6/9/2017 7:20 AM  Timeout: 6/9/2017 7:19 AM   End time: 6/9/2017 7:26 AM  Staffing  Anesthesiologist: SENAIT RAUSCH  Resident/CRNA: PRAKASH HUERTAS  Performed: resident/CRNA   Preanesthetic Checklist  Completed: patient identified, site marked, surgical consent, pre-op evaluation, timeout performed, IV checked, risks and benefits discussed and monitors and equipment checked  Peripheral Block  Patient position: supine  Prep: ChloraPrep  Patient monitoring: heart rate, cardiac monitor, continuous pulse ox and frequent blood pressure checks  Block type: supraclavicular and intercostobrachial  Laterality: left  Injection technique: single shot  Needle  Needle type: Short-bevel   Needle gauge: 22 G  Needle length: 2 in  Needle localization: ultrasound guidance   -ultrasound image captured on disc.  Assessment  Injection assessment: negative aspiration, negative parasthesia and local visualized surrounding nerve  Paresthesia pain: none  Heart rate change: no  Slow fractionated injection: yes  Medications:  Bolus administered: 30 mL of 2.0 mepivacaine  Epinephrine added: 3.75 mcg/mL (1/300,000)  Additional Notes  Patient initially refused block complaining of numbness in right arm after previous surgery.  Surgical team told they patient she could not have the surgery without a block, and then she agreed.  Most of the numbness in the right arm appeared to be confined to the palmar side of the forearm.  No complaints of numbness or weakness in right hand.  No complications noted with this procedure.

## 2017-06-09 NOTE — INTERVAL H&P NOTE
The patient has been examined and the H&P has been reviewed:    I concur with the findings and no changes have occurred since H&P was written.    Anesthesia/Surgery risks, benefits and alternative options discussed and understood by patient/family.    Review of patient's allergies indicates:  No Known Allergies        Active Hospital Problems    Diagnosis  POA    ESRD (end stage renal disease) on dialysis [N18.6, Z99.2]  Not Applicable      Resolved Hospital Problems    Diagnosis Date Resolved POA   No resolved problems to display.

## 2017-06-09 NOTE — ANESTHESIA PREPROCEDURE EVALUATION
06/09/2017  Agueda Smita Sharon is a 37 y.o., female.    Pre-op Assessment         Review of Systems

## 2017-06-21 ENCOUNTER — OFFICE VISIT (OUTPATIENT)
Dept: VASCULAR SURGERY | Facility: CLINIC | Age: 37
End: 2017-06-21
Payer: MEDICARE

## 2017-06-21 ENCOUNTER — HOSPITAL ENCOUNTER (OUTPATIENT)
Dept: VASCULAR SURGERY | Facility: CLINIC | Age: 37
Discharge: HOME | End: 2017-06-21
Attending: STUDENT IN AN ORGANIZED HEALTH CARE EDUCATION/TRAINING PROGRAM
Payer: MEDICARE

## 2017-06-21 VITALS
BODY MASS INDEX: 25.73 KG/M2 | SYSTOLIC BLOOD PRESSURE: 137 MMHG | TEMPERATURE: 98 F | HEIGHT: 66 IN | DIASTOLIC BLOOD PRESSURE: 85 MMHG | HEART RATE: 89 BPM | WEIGHT: 160.13 LBS

## 2017-06-21 DIAGNOSIS — T82.9XXD COMPLICATIONS DUE TO RENAL DIALYSIS DEVICE, IMPLANT, AND GRAFT, SUBSEQUENT ENCOUNTER: Primary | ICD-10-CM

## 2017-06-21 DIAGNOSIS — Z99.2 ESRD (END STAGE RENAL DISEASE) ON DIALYSIS: ICD-10-CM

## 2017-06-21 DIAGNOSIS — Z99.2 ESRD (END STAGE RENAL DISEASE) ON DIALYSIS: Primary | ICD-10-CM

## 2017-06-21 DIAGNOSIS — N18.6 ESRD (END STAGE RENAL DISEASE) ON DIALYSIS: ICD-10-CM

## 2017-06-21 DIAGNOSIS — N18.6 ESRD (END STAGE RENAL DISEASE) ON DIALYSIS: Primary | ICD-10-CM

## 2017-06-21 PROCEDURE — 93990 DOPPLER FLOW TESTING: CPT | Mod: 26,S$PBB,, | Performed by: SURGERY

## 2017-06-21 PROCEDURE — 99499 UNLISTED E&M SERVICE: CPT | Mod: S$PBB,,, | Performed by: SURGERY

## 2017-06-21 PROCEDURE — 99999 PR PBB SHADOW E&M-EST. PATIENT-LVL III: CPT | Mod: PBBFAC,,, | Performed by: SURGERY

## 2017-06-21 PROCEDURE — 99213 OFFICE O/P EST LOW 20 MIN: CPT | Mod: PBBFAC | Performed by: SURGERY

## 2017-06-21 NOTE — PROGRESS NOTES
Agueda Herrera  06/21/2017    HPI:  Patient is a 37 y.o. female with a h/o FSGS, stage V CKD / on HD since June 2013 who initially presented for evaluation of AV access.  She's had 3 previous failed accesses. She is currently attending nursing school. She is R hand dominant.    Interval history: She is s/p creation of 1st stage L basilic vein / brachial AVF on 6/9/17. She denies any left hand numbness or weakness. Still experiencing some numbness from the wrist to the elbow of the R forearm.    Has had previous access created at [facility]:  L rc avg, LUE AVG, RUE AVG - Dr Renner/Kierra at [facility]  LUE AVG worked for 3 yrs but required multiple interventions to keep it open  RUE AVG worked for ~ month before it occluded; no thrombectomy done    No MI/stroke  Tobacco use: denies    Nephrologist is Dr Zuniga    Social History     Social History    Marital status: Single     Spouse name: N/A    Number of children: N/A    Years of education: N/A     Occupational History    Not on file.     Social History Main Topics    Smoking status: Never Smoker    Smokeless tobacco: Not on file    Alcohol use No    Drug use: No    Sexual activity: Yes     Partners: Male     Other Topics Concern    Not on file     Social History Narrative    No narrative on file       Current Outpatient Prescriptions:     amlodipine (NORVASC) 10 MG tablet, , Disp: , Rfl:     atenolol (TENORMIN) 25 MG tablet, , Disp: , Rfl:     chlorhexidine (PERIDEX) 0.12 % solution, , Disp: , Rfl: 0    ferrous sulfate 325 (65 FE) MG EC tablet, Take 325 mg by mouth 3 (three) times daily with meals., Disp: , Rfl:     hydrocodone-acetaminophen 5-325mg (NORCO) 5-325 mg per tablet, Take 1 tablet by mouth every 6 (six) hours as needed for Pain., Disp: 20 tablet, Rfl: 0    methimazole (TAPAZOLE) 10 MG Tab, , Disp: , Rfl: 0    oxycodone-acetaminophen (PERCOCET) 5-325 mg per tablet, Take 1 tablet by mouth every 4 (four) hours as needed., Disp: 45  tablet, Rfl: 0    sodium bicarbonate 650 MG tablet, Take 1,300 mg by mouth 3 (three) times daily., Disp: , Rfl: 0    vitamin D (VITAMIN D3) 1000 units Tab, Take 1,000 Units by mouth once daily., Disp: , Rfl:     REVIEW OF SYSTEMS:  General: negative; ENT: negative; Allergy and Immunology: negative; Hematological and Lymphatic: Negative; Endocrine: negative; Respiratory: no cough, shortness of breath, or wheezing; Cardiovascular: no chest pain or dyspnea on exertion; Gastrointestinal: no abdominal pain/back, change in bowel habits, or bloody stools; Genito-Urinary: no dysuria, trouble voiding, or hematuria; Musculoskeletal: negative  Neurological: no TIA or stroke symptoms; Psychiatric: no nervousness, anxiety or depression.    PHYSICAL EXAM:   Vitals:    06/21/17 1001   BP: 137/85   Pulse: 89   Temp: 97.6 °F (36.4 °C)            General appearance:  Alert, well-appearing, and in no distress.  Oriented to person, place, and time   Neurological: Normal speech, no focal findings noted; CN II - XII grossly intact           Musculoskeletal: Digits/nail without cyanosis/clubbing.  Normal muscle strength/tone.                 Neck: Supple, no significant adenopathy; thyroid is not enlarged                  No carotid bruit can be auscultated                Chest:  Clear to auscultation, no wheezes, rales or rhonchi, symmetric air entry     No use of accessory muscles             Cardiac: Normal rate and regular rhythm, S1 and S2 normal; PMI non-displaced          Abdomen: Soft, nontender, nondistended, no masses or organomegaly     No rebound tenderness noted; bowel sounds normal     Pulsatile aortic mass is not palpable.     No groin adenopathy      Extremities:   2+ brachial, 1+ R radial pulse bilaterally. L lateral antecubital incision is dry and intact. Incision is well-approximated; stitches in place.     Good thrill is palpable    LAB RESULTS:  Lab Results   Component Value Date    K 3.6 05/10/2017    CREATININE  5.3 (H) 05/10/2017     Lab Results   Component Value Date    WBC 7.21 05/10/2017    HCT 33.4 (L) 05/10/2017     05/10/2017     No results found for: HGBA1C     IMAGING:  Dialysis access duplex: Imaging reveals a patent Lt brachio-basilic AV fistula. A velocity elevation of 822cm/s is noted near the arterial anastomosis with narrowing, suggestive of a focal hemodynamically significant stenosis.   Volume flow at mid-bicep level measures 629cm.    Vein mapping:  R basilic 2.9, 3.4mm  L basilic 3.1, 3.6mm    R brachial vein: 3.4 - 3.5 mm  L brachial vein:  3.6mm    Occluded cephalic veins  Basilic veins R 2.2 to 2.3 mm    IMP/PLAN:  37 y.o. female with h/o FSGS, stage V CKD / on HD since June 2013 who is here today for evaluation of AV access.  She's had 3 previous failed accesses at outside facility - now failed an attempt at R BC AVF; of note  R cephalic vein was scarred; s/p creation 1st stage L basilic vein / brachial AVF by me on 6/9/17    Will need a transposition in 2-3 months later after her initial surgery.   Patient will call for follow up to discuss 2nd stage surgery  We will let her know if she needs to come in sooner    Lilia Solorzano MD  PGY-3  [phone] (pager)      Vascular Attending  Agree with above  May need a trans-radial access to look at any proximal lesion prior to transposition  Fu in ~8-10 weeks.    Owen Fraser MD FACS

## 2017-06-23 ENCOUNTER — CLINICAL SUPPORT (OUTPATIENT)
Dept: VASCULAR SURGERY | Facility: CLINIC | Age: 37
End: 2017-06-23
Payer: MEDICARE

## 2017-06-23 VITALS
TEMPERATURE: 98 F | BODY MASS INDEX: 25.85 KG/M2 | WEIGHT: 160.88 LBS | HEART RATE: 62 BPM | DIASTOLIC BLOOD PRESSURE: 86 MMHG | SYSTOLIC BLOOD PRESSURE: 132 MMHG | HEIGHT: 66 IN

## 2017-06-23 PROCEDURE — 99213 OFFICE O/P EST LOW 20 MIN: CPT | Mod: PBBFAC

## 2017-06-23 PROCEDURE — 99999 PR PBB SHADOW E&M-EST. PATIENT-LVL III: CPT | Mod: PBBFAC,,,

## 2017-06-23 NOTE — PROGRESS NOTES
6 Sutures were removed from the left forearm from  Recent fistulagram that was done by Dr. MICH Fraser. Pt is to follow-up with Dr. Fraser in 8-10week

## 2017-06-27 ENCOUNTER — CLINICAL SUPPORT (OUTPATIENT)
Dept: AUDIOLOGY | Facility: CLINIC | Age: 37
End: 2017-06-27
Payer: MEDICARE

## 2017-06-27 ENCOUNTER — OFFICE VISIT (OUTPATIENT)
Dept: OTOLARYNGOLOGY | Facility: CLINIC | Age: 37
End: 2017-06-27
Payer: MEDICARE

## 2017-06-27 VITALS
DIASTOLIC BLOOD PRESSURE: 90 MMHG | SYSTOLIC BLOOD PRESSURE: 131 MMHG | BODY MASS INDEX: 25.98 KG/M2 | WEIGHT: 160.94 LBS

## 2017-06-27 DIAGNOSIS — Z86.69 HISTORY OF FREQUENT EAR INFECTIONS: ICD-10-CM

## 2017-06-27 DIAGNOSIS — J34.3 NASAL TURBINATE HYPERTROPHY: ICD-10-CM

## 2017-06-27 DIAGNOSIS — Z86.69 HISTORY OF OTITIS MEDIA: Primary | ICD-10-CM

## 2017-06-27 DIAGNOSIS — Z01.10 HEARING EXAM WITHOUT ABNORMAL FINDINGS: Primary | ICD-10-CM

## 2017-06-27 PROCEDURE — 99213 OFFICE O/P EST LOW 20 MIN: CPT | Mod: PBBFAC,27 | Performed by: NURSE PRACTITIONER

## 2017-06-27 PROCEDURE — 99203 OFFICE O/P NEW LOW 30 MIN: CPT | Mod: S$PBB,,, | Performed by: NURSE PRACTITIONER

## 2017-06-27 PROCEDURE — 99999 PR PBB SHADOW E&M-EST. PATIENT-LVL I: CPT | Mod: PBBFAC,,,

## 2017-06-27 PROCEDURE — 99999 PR PBB SHADOW E&M-EST. PATIENT-LVL III: CPT | Mod: PBBFAC,,, | Performed by: NURSE PRACTITIONER

## 2017-06-27 NOTE — PATIENT INSTRUCTIONS
OTC Flonase daily (spray laterally).  Educated on and provided Noise and Hearing pamphlet.  Audiogram Reviewed: WNL.  Hearing conservation strongly recommended.  Trial of amplification is not currently indicated.  Re-check of hearing in 18-24 months or prn sooner if symptoms worsen.   F/U with PCP as per schedule.  Referral to Dr. Justice for further eval.   RTC prn or sooner if symptoms worsen.

## 2017-06-27 NOTE — PROGRESS NOTES
Subjective:       Patient ID: Agueda Herrera is a 37 y.o. female.    Chief Complaint: Ear Fullness    HPI   Agueda Herrera is a 36 y/o AAF who presents with a one year and half h/o recurrent otitis media. Symptoms are stable. Nothing aggravates symptoms. Treatments tried include oral antibiotics and steroid injections with relief of symptoms. She denies loud noise exposure, otologic surgery, pe tubes, otorrhea, tinnitus, dizziness, or vertigo. She denies recent sinus infection or cold/URI. She is upset because she thought she was being seen by a physician and complains that when her symptoms are present she is unable to schedule an appointment and that when she finally gets the appointment her symptoms are resolved.  Symptoms began one year and half ago. She reports getting three ear infection last year and had one ear infection this year. She is requesting consultation for bilateral PE tube placement.    She has a h/o HTN(norvasc,atenolol) and ESRD (on dialysis).    Past Medical History: Patient has a past medical history of Dialysis patient; Hypertension; and Kidney disease.    Past Surgical History: Patient has a past surgical history that includes Tubal ligation and av graft (Left).    Social History: Patient reports that she has never smoked. She does not have any smokeless tobacco history on file. She reports that she does not drink alcohol or use drugs.    Family History: family history is not on file.    Medications:   Current Outpatient Prescriptions   Medication Sig    amlodipine (NORVASC) 10 MG tablet     atenolol (TENORMIN) 25 MG tablet     chlorhexidine (PERIDEX) 0.12 % solution     ferrous sulfate 325 (65 FE) MG EC tablet Take 325 mg by mouth 3 (three) times daily with meals.    hydrocodone-acetaminophen 5-325mg (NORCO) 5-325 mg per tablet Take 1 tablet by mouth every 6 (six) hours as needed for Pain.    methimazole (TAPAZOLE) 10 MG Tab     oxycodone-acetaminophen (PERCOCET)  5-325 mg per tablet Take 1 tablet by mouth every 4 (four) hours as needed.    sodium bicarbonate 650 MG tablet Take 1,300 mg by mouth 3 (three) times daily.    vitamin D (VITAMIN D3) 1000 units Tab Take 1,000 Units by mouth once daily.     No current facility-administered medications for this visit.        Allergies: Patient has No Known Allergies.    Review of Systems   Constitutional: Negative for activity change, appetite change, chills, diaphoresis, fatigue, fever and unexpected weight change.   HENT: Negative for congestion, dental problem, ear discharge, ear pain, facial swelling, hearing loss, nosebleeds, postnasal drip, rhinorrhea, sinus pressure, sneezing, sore throat, tinnitus, trouble swallowing and voice change.    Eyes: Negative for pain and visual disturbance.   Respiratory: Negative for cough, chest tightness, shortness of breath, wheezing and stridor.    Cardiovascular: Negative for chest pain.   Musculoskeletal: Negative for gait problem and neck pain.   Skin: Negative for color change and rash.   Allergic/Immunologic: Negative for environmental allergies.   Neurological: Negative for dizziness, seizures, syncope, facial asymmetry, speech difficulty, weakness, light-headedness, numbness and headaches.   Psychiatric/Behavioral: Negative for agitation and confusion. The patient is not nervous/anxious.        Objective:       BP (!) 131/90 (BP Location: Right arm, Patient Position: Sitting, BP Method: Automatic)   Wt 73 kg (160 lb 15 oz)   LMP 06/08/2017   BMI 25.98 kg/m²     Physical Exam   Constitutional: She is oriented to person, place, and time. She appears well-developed and well-nourished.   HENT:   Head: Normocephalic and atraumatic. Not macrocephalic and not microcephalic. Head is without raccoon's eyes, without Ramos's sign, without abrasion, without contusion, without laceration, without right periorbital erythema and without left periorbital erythema. Hair is normal.   Right Ear:  Tympanic membrane, external ear and ear canal normal. No lacerations. No drainage, swelling or tenderness. No foreign bodies. No mastoid tenderness. Tympanic membrane is not injected, not scarred, not perforated, not erythematous, not retracted and not bulging. Tympanic membrane mobility is normal. No middle ear effusion. No hemotympanum. No decreased hearing is noted.   Left Ear: Tympanic membrane, external ear and ear canal normal. No lacerations. No drainage, swelling or tenderness. No foreign bodies. No mastoid tenderness. Tympanic membrane is not injected, not scarred, not perforated, not erythematous, not retracted and not bulging. Tympanic membrane mobility is normal.  No middle ear effusion. No hemotympanum. No decreased hearing is noted.   Nose: Nose normal. No mucosal edema, rhinorrhea, nose lacerations, sinus tenderness, nasal deformity or nasal septal hematoma. No epistaxis.  No foreign bodies. Right sinus exhibits no maxillary sinus tenderness and no frontal sinus tenderness. Left sinus exhibits no maxillary sinus tenderness and no frontal sinus tenderness.   Mouth/Throat: Uvula is midline, oropharynx is clear and moist and mucous membranes are normal. Mucous membranes are not pale, not dry and not cyanotic. She does not have dentures. No oral lesions. No trismus in the jaw. Normal dentition. No dental abscesses, uvula swelling, lacerations or dental caries. No oropharyngeal exudate, posterior oropharyngeal edema, posterior oropharyngeal erythema or tonsillar abscesses.   Eyes: Conjunctivae, EOM and lids are normal. Pupils are equal, round, and reactive to light.   Neck: Trachea normal and normal range of motion. Neck supple. No spinous process tenderness and no muscular tenderness present. No neck rigidity. No edema, no erythema and normal range of motion present. No thyroid mass and no thyromegaly present.   Pulmonary/Chest: Effort normal.   Abdominal: Soft.   Musculoskeletal: Normal range of motion.    Lymphadenopathy:        Head (right side): No submental, no submandibular, no tonsillar, no preauricular and no posterior auricular adenopathy present.        Head (left side): No submental, no submandibular, no tonsillar, no preauricular, no posterior auricular and no occipital adenopathy present.     She has no cervical adenopathy.   Neurological: She is alert and oriented to person, place, and time. No cranial nerve deficit or sensory deficit.   Skin: Skin is warm and dry.   Psychiatric: She has a normal mood and affect. Judgment and thought content normal. She is agitated.   Nursing note and vitals reviewed.      As a result of this patients history and examination findings, a comprehensive audiogram was ordered to determine the level of hearing/hearing loss.        Assessment:       1. History of otitis media    2. History of frequent ear infections    3. Nasal turbinate hypertrophy        Plan:       OTC Flonase daily (spray laterally).  Educated on and provided Noise and Hearing pamphlet.  Audiogram Reviewed: WNL.  Hearing conservation strongly recommended.  Trial of amplification is not currently indicated.  Re-check of hearing in 18-24 months or prn sooner if symptom worsen.  F/U with PCP as per schedule.  Referral to Dr. Justice for further eval.   RTC prn or sooner if symptoms worsen.

## 2020-01-08 ENCOUNTER — TELEPHONE (OUTPATIENT)
Dept: VASCULAR SURGERY | Facility: CLINIC | Age: 40
End: 2020-01-08

## 2020-01-08 DIAGNOSIS — N18.6 END STAGE RENAL DISEASE: Primary | ICD-10-CM

## 2020-01-08 NOTE — TELEPHONE ENCOUNTER
Contacted pt and appt scheduled.    ----- Message from Shreya Joshi sent at 1/8/2020 11:30 AM CST -----  Contact: Patient  Patient would like a sooner appt. Scheduled for 01/23 earliest available        Contact: [phone]

## 2020-01-09 ENCOUNTER — HOSPITAL ENCOUNTER (OUTPATIENT)
Dept: VASCULAR SURGERY | Facility: CLINIC | Age: 40
Discharge: HOME | End: 2020-01-09
Attending: SURGERY
Payer: MEDICARE

## 2020-01-09 DIAGNOSIS — N18.6 END STAGE RENAL DISEASE: ICD-10-CM

## 2020-01-09 PROCEDURE — 93990 PR DUPLEX HEMODIALYSIS ACCESS: ICD-10-PCS | Mod: 26,S$PBB,, | Performed by: SURGERY

## 2020-01-09 PROCEDURE — 93990 DOPPLER FLOW TESTING: CPT | Mod: PBBFAC | Performed by: SURGERY

## 2020-01-09 PROCEDURE — 93990 DOPPLER FLOW TESTING: CPT | Mod: 26,S$PBB,, | Performed by: SURGERY

## 2020-01-16 ENCOUNTER — OFFICE VISIT (OUTPATIENT)
Dept: VASCULAR SURGERY | Facility: CLINIC | Age: 40
End: 2020-01-16
Payer: MEDICARE

## 2020-01-16 VITALS
TEMPERATURE: 92 F | WEIGHT: 165.38 LBS | SYSTOLIC BLOOD PRESSURE: 131 MMHG | HEART RATE: 86 BPM | DIASTOLIC BLOOD PRESSURE: 79 MMHG | BODY MASS INDEX: 25.96 KG/M2 | HEIGHT: 67 IN

## 2020-01-16 DIAGNOSIS — Z99.2 ESRD (END STAGE RENAL DISEASE) ON DIALYSIS: Primary | ICD-10-CM

## 2020-01-16 DIAGNOSIS — N18.6 ESRD (END STAGE RENAL DISEASE) ON DIALYSIS: Primary | ICD-10-CM

## 2020-01-16 PROCEDURE — 99213 OFFICE O/P EST LOW 20 MIN: CPT | Mod: PBBFAC | Performed by: SURGERY

## 2020-01-16 PROCEDURE — 99999 PR PBB SHADOW E&M-EST. PATIENT-LVL III: CPT | Mod: PBBFAC,,, | Performed by: SURGERY

## 2020-01-16 PROCEDURE — 99214 PR OFFICE/OUTPT VISIT, EST, LEVL IV, 30-39 MIN: ICD-10-PCS | Mod: S$PBB,,, | Performed by: SURGERY

## 2020-01-16 PROCEDURE — 99999 PR PBB SHADOW E&M-EST. PATIENT-LVL III: ICD-10-PCS | Mod: PBBFAC,,, | Performed by: SURGERY

## 2020-01-16 PROCEDURE — 99214 OFFICE O/P EST MOD 30 MIN: CPT | Mod: S$PBB,,, | Performed by: SURGERY

## 2020-01-16 NOTE — PROGRESS NOTES
Agueda Herrera  01/16/2020  HPI:  Patient is a 37 y.o. female with a h/o FSGS, stage V CKD / on HD since June 2013  s/p creation of 1st stage L basilic vein / brachial AVF on 6/9/17. She never ended up needing to use this AVF because she underwent a kidney transplant on 9/17/17 and has not required dialysis since then. She presents today with complaints of pain near her graft site. Pain occurs intermittently without clear aggravating factors. Denies numbness or tingling, no motor deficits.      Has had previous access created at [facility]:  L rc avg, LUE AVG, RUE AVG - Dr Renner/Kierra at [facility]  LUE AVG worked for 3 yrs but required multiple interventions to keep it open  RUE AVG worked for ~ month before it occluded; no thrombectomy done     No MI/stroke  Tobacco use: denies     Nephrologist is Dr Zuniga    Past Medical History:   Diagnosis Date    Dialysis patient     Hypertension     Kidney disease      Past Surgical History:   Procedure Laterality Date    av graft Left     TUBAL LIGATION       No family history on file.  Social History     Socioeconomic History    Marital status: Single     Spouse name: Not on file    Number of children: Not on file    Years of education: Not on file    Highest education level: Not on file   Occupational History    Not on file   Social Needs    Financial resource strain: Not on file    Food insecurity:     Worry: Not on file     Inability: Not on file    Transportation needs:     Medical: Not on file     Non-medical: Not on file   Tobacco Use    Smoking status: Never Smoker   Substance and Sexual Activity    Alcohol use: No    Drug use: No    Sexual activity: Yes     Partners: Male   Lifestyle    Physical activity:     Days per week: Not on file     Minutes per session: Not on file    Stress: Not on file   Relationships    Social connections:     Talks on phone: Not on file     Gets together: Not on file     Attends Shinto service: Not on file      Active member of club or organization: Not on file     Attends meetings of clubs or organizations: Not on file     Relationship status: Not on file   Other Topics Concern    Not on file   Social History Narrative    Not on file       Current Outpatient Medications:     amlodipine (NORVASC) 10 MG tablet, , Disp: , Rfl:     atenolol (TENORMIN) 25 MG tablet, , Disp: , Rfl:     chlorhexidine (PERIDEX) 0.12 % solution, , Disp: , Rfl: 0    ferrous sulfate 325 (65 FE) MG EC tablet, Take 325 mg by mouth 3 (three) times daily with meals., Disp: , Rfl:     hydrocodone-acetaminophen 5-325mg (NORCO) 5-325 mg per tablet, Take 1 tablet by mouth every 6 (six) hours as needed for Pain., Disp: 20 tablet, Rfl: 0    methimazole (TAPAZOLE) 10 MG Tab, , Disp: , Rfl: 0    oxycodone-acetaminophen (PERCOCET) 5-325 mg per tablet, Take 1 tablet by mouth every 4 (four) hours as needed., Disp: 45 tablet, Rfl: 0    sodium bicarbonate 650 MG tablet, Take 1,300 mg by mouth 3 (three) times daily., Disp: , Rfl: 0    vitamin D (VITAMIN D3) 1000 units Tab, Take 1,000 Units by mouth once daily., Disp: , Rfl:     REVIEW OF SYSTEMS:  General: negative; ENT: negative; Allergy and Immunology: negative; Hematological and Lymphatic: Negative; Endocrine: negative; Respiratory: no cough, shortness of breath, or wheezing; Cardiovascular: no chest pain or dyspnea on exertion; Gastrointestinal: no abdominal pain/back, change in bowel habits, or bloody stools; Genito-Urinary: no dysuria, trouble voiding, or hematuria; Musculoskeletal: negative  Neurological: no TIA or stroke symptoms; Psychiatric: no nervousness, anxiety or depression.    PHYSICAL EXAM:   Vitals:    01/16/20 1328   BP: 131/79   Pulse: 86   Temp: (!) 92.3 °F (33.5 °C)       General appearance:  Alert, well-appearing, and in no distress.  Oriented to person, place, and time   Neurological: Normal speech, no focal findings noted; CN II - XII grossly intact            Musculoskeletal: Digits/nail without cyanosis/clubbing.  Normal muscle strength/tone.                 Neck: Supple, no significant adenopathy; thyroid is not enlarged                  No carotid bruits can be auscultated                Chest:  Clear to auscultation, no wheezes, rales or rhonchi, symmetric air entry     No use of accessory muscles             Cardiac: Normal rate and regular rhythm, S1 and S2 normal; PMI non-displaced          Abdomen: Soft, nontender, nondistended, no masses or organomegaly     No rebound tenderness noted; bowel sounds normal     Pulsatile aortic mass is not palpable.     No groin adenopathy      Extremities:   2+ radial, brachial pulses bilaterally     2+ pedal pulses palpable bilaterally.     LUE with strong thrill        LAB RESULTS:  Lab Results   Component Value Date    K 3.6 05/10/2017    CREATININE 5.3 (H) 05/10/2017     Lab Results   Component Value Date    WBC 7.21 05/10/2017    HCT 33.4 (L) 05/10/2017     05/10/2017     No results found for: HGBA1C  IMAGING:  Dialysis access duplex 1/16/20: Color flow duplex exam reveals a patent left Brachiobasilic AV fistula. There are accelerated velocities noted just distal to the arterial anastomosis with max velocity of  634cm/s. Volume flow at mid bicep level measures 2953 ml/min.    PRIOR:  Dialysis access duplex June 2017: Imaging reveals a patent Lt brachio-basilic AV fistula. A velocity elevation of 822cm/s is noted near the arterial anastomosis with narrowing, suggestive of a focal hemodynamically significant stenosis.   Volume flow at mid-bicep level measures 629cm.     Vein mapping June 2017:  R basilic 2.9, 3.4mm  L basilic 3.1, 3.6mm     R brachial vein: 3.4 - 3.5 mm  L brachial vein:  3.6mm     Occluded cephalic veins  Basilic veins R 2.2 to 2.3 mm    IMP/PLAN:  39 y.o. female with h/o FSGS, stage V CKD / on HD since June 2013 - had 3 previous failed accesses at outside facility  s/p creation 1st stage L basilic  vein / brachial AVF by me on 6/9/17  Received KTx Sept 2017 in [facility] - doing well  Re-assured mild L upper arm edema is not limb threatening  F/u PRN    Owen Fraser MD FACS  Vascular/Endovascular Surgery

## 2020-10-22 ENCOUNTER — TELEPHONE (OUTPATIENT)
Dept: VASCULAR SURGERY | Facility: CLINIC | Age: 40
End: 2020-10-22

## 2020-10-22 ENCOUNTER — HOSPITAL ENCOUNTER (OUTPATIENT)
Dept: VASCULAR SURGERY | Facility: CLINIC | Age: 40
Discharge: HOME | End: 2020-10-22
Attending: SURGERY
Payer: MEDICARE

## 2020-10-22 DIAGNOSIS — Z99.2 ESRD (END STAGE RENAL DISEASE) ON DIALYSIS: Primary | ICD-10-CM

## 2020-10-22 DIAGNOSIS — N18.6 ESRD (END STAGE RENAL DISEASE) ON DIALYSIS: Primary | ICD-10-CM

## 2020-10-22 DIAGNOSIS — R20.0 NUMBNESS AND TINGLING IN LEFT HAND: ICD-10-CM

## 2020-10-22 DIAGNOSIS — Z99.2 ESRD (END STAGE RENAL DISEASE) ON DIALYSIS: ICD-10-CM

## 2020-10-22 DIAGNOSIS — N18.6 ESRD (END STAGE RENAL DISEASE) ON DIALYSIS: ICD-10-CM

## 2020-10-22 DIAGNOSIS — R20.2 NUMBNESS AND TINGLING IN LEFT HAND: ICD-10-CM

## 2020-10-22 PROCEDURE — 93990 DOPPLER FLOW TESTING: CPT | Mod: 26,S$PBB,, | Performed by: SURGERY

## 2020-10-22 PROCEDURE — 93990 DOPPLER FLOW TESTING: CPT | Mod: PBBFAC | Performed by: SURGERY

## 2020-10-22 PROCEDURE — 93990 PR DUPLEX HEMODIALYSIS ACCESS: ICD-10-PCS | Mod: 26,S$PBB,, | Performed by: SURGERY

## 2020-10-22 NOTE — TELEPHONE ENCOUNTER
Spoke with pt who c/o left arm pain,swelling and numbness to left arm.Pt informed to come in and have u/s done today and appt time confirm.Pt verbalized understanding of information received.  ----- Message from Shay Cobos sent at 10/22/2020  8:46 AM CDT -----  Patient called to speak w/ someone regarding scheduling for an appt today 10/22 to have her access evaluated, states her entire arm is swollen     Callback: [phone]

## 2020-10-28 ENCOUNTER — OFFICE VISIT (OUTPATIENT)
Dept: VASCULAR SURGERY | Facility: CLINIC | Age: 40
End: 2020-10-28
Payer: MEDICARE

## 2020-10-28 VITALS
SYSTOLIC BLOOD PRESSURE: 132 MMHG | DIASTOLIC BLOOD PRESSURE: 82 MMHG | WEIGHT: 174.19 LBS | TEMPERATURE: 99 F | HEART RATE: 84 BPM | BODY MASS INDEX: 27.34 KG/M2 | HEIGHT: 67 IN

## 2020-10-28 DIAGNOSIS — Z99.2 ESRD (END STAGE RENAL DISEASE) ON DIALYSIS: ICD-10-CM

## 2020-10-28 DIAGNOSIS — N18.6 ESRD (END STAGE RENAL DISEASE) ON DIALYSIS: Primary | ICD-10-CM

## 2020-10-28 DIAGNOSIS — N18.6 ESRD (END STAGE RENAL DISEASE) ON DIALYSIS: ICD-10-CM

## 2020-10-28 DIAGNOSIS — Z01.818 PRE-OP EVALUATION: Primary | ICD-10-CM

## 2020-10-28 DIAGNOSIS — Z99.2 ESRD (END STAGE RENAL DISEASE) ON DIALYSIS: Primary | ICD-10-CM

## 2020-10-28 PROCEDURE — 99999 PR PBB SHADOW E&M-EST. PATIENT-LVL III: CPT | Mod: PBBFAC,,, | Performed by: SURGERY

## 2020-10-28 PROCEDURE — 99214 PR OFFICE/OUTPT VISIT, EST, LEVL IV, 30-39 MIN: ICD-10-PCS | Mod: S$PBB,,, | Performed by: SURGERY

## 2020-10-28 PROCEDURE — 99214 OFFICE O/P EST MOD 30 MIN: CPT | Mod: S$PBB,,, | Performed by: SURGERY

## 2020-10-28 PROCEDURE — 99213 OFFICE O/P EST LOW 20 MIN: CPT | Mod: PBBFAC | Performed by: SURGERY

## 2020-10-28 PROCEDURE — 99999 PR PBB SHADOW E&M-EST. PATIENT-LVL III: ICD-10-PCS | Mod: PBBFAC,,, | Performed by: SURGERY

## 2020-10-28 RX ORDER — FERROUS SULFATE 325(65) MG
325 TABLET ORAL NIGHTLY
COMMUNITY
Start: 2020-08-17

## 2020-10-28 RX ORDER — ASPIRIN 81 MG/1
81 TABLET ORAL NIGHTLY
COMMUNITY

## 2020-10-28 RX ORDER — EVEROLIMUS 0.5 MG/1
TABLET ORAL
COMMUNITY
Start: 2020-10-12

## 2020-10-28 RX ORDER — MYCOPHENOLIC ACID 360 MG/1
360 TABLET, DELAYED RELEASE ORAL EVERY MORNING
COMMUNITY
Start: 2020-10-12

## 2020-10-28 NOTE — PROGRESS NOTES
Agueda Herrera  10/28/2020  HPI:  Patient is a 40 y.o. female with a h/o FSGS, stage V CKD / on HD since June 2013 - comes for f/u with increasing discomfort from L forearm edema  s/p creation of 1st stage L basilic vein / brachial AVF on 6/9/17. She never ended up needing to use this AVF because she underwent a kidney transplant on 9/17/17 and has not required dialysis since then. She presents today with complaints of pain near her graft site. Pain occurs intermittently without clear aggravating factors. Denies numbness or tingling, no motor deficits.      Has had previous access created at [facility]:  L rc avg, LUE AVG, RUE AVG - Dr Renner/Kierra at [facility]  LUE AVG worked for 3 yrs but required multiple interventions to keep it open  RUE AVG worked for ~ month before it occluded; no thrombectomy done     No MI/stroke  Tobacco use: denies     Nephrologist is Dr Zuniga    Past Medical History:   Diagnosis Date    Dialysis patient     Hypertension     Kidney disease      Past Surgical History:   Procedure Laterality Date    av graft Left     TUBAL LIGATION       No family history on file.  Social History     Socioeconomic History    Marital status: Single     Spouse name: Not on file    Number of children: Not on file    Years of education: Not on file    Highest education level: Not on file   Occupational History    Not on file   Social Needs    Financial resource strain: Not on file    Food insecurity     Worry: Not on file     Inability: Not on file    Transportation needs     Medical: Not on file     Non-medical: Not on file   Tobacco Use    Smoking status: Never Smoker   Substance and Sexual Activity    Alcohol use: No    Drug use: No    Sexual activity: Yes     Partners: Male   Lifestyle    Physical activity     Days per week: Not on file     Minutes per session: Not on file    Stress: Not on file   Relationships    Social connections     Talks on phone: Not on file     Gets  together: Not on file     Attends Druze service: Not on file     Active member of club or organization: Not on file     Attends meetings of clubs or organizations: Not on file     Relationship status: Not on file   Other Topics Concern    Not on file   Social History Narrative    Not on file       Current Outpatient Medications:     aspirin (ECOTRIN) 81 MG EC tablet, Take 81 mg by mouth., Disp: , Rfl:     ferrous sulfate (FEOSOL) 325 mg (65 mg iron) Tab tablet, TK 1 T PO ONCE A DAY, Disp: , Rfl:     mycophenolate (MYFORTIC) 360 MG TbEC, , Disp: , Rfl:     vitamin D (VITAMIN D3) 1000 units Tab, Take 1,000 Units by mouth once daily., Disp: , Rfl:     ZORTRESS 0.5 mg tablet, TK 3 TS PO Q 12 H, Disp: , Rfl:     REVIEW OF SYSTEMS:  General: negative; ENT: negative; Allergy and Immunology: negative; Hematological and Lymphatic: Negative; Endocrine: negative; Respiratory: no cough, shortness of breath, or wheezing; Cardiovascular: no chest pain or dyspnea on exertion; Gastrointestinal: no abdominal pain/back, change in bowel habits, or bloody stools; Genito-Urinary: no dysuria, trouble voiding, or hematuria; Musculoskeletal: negative  Neurological: no TIA or stroke symptoms; Psychiatric: no nervousness, anxiety or depression.    PHYSICAL EXAM:   Vitals:    10/28/20 0849   BP: 132/82   Pulse: 84   Temp: 98.8 °F (37.1 °C)       General appearance:  Alert, well-appearing, and in no distress.  Oriented to person, place, and time   Neurological: Normal speech, no focal findings noted; CN II - XII grossly intact           Musculoskeletal: Digits/nail without cyanosis/clubbing.  Normal muscle strength/tone.                 Neck: Supple, no significant adenopathy; thyroid is not enlarged                  No carotid bruits can be auscultated                Chest:  Clear to auscultation, no wheezes, rales or rhonchi, symmetric air entry     No use of accessory muscles             Cardiac: Normal rate and regular rhythm,  S1 and S2 normal; PMI non-displaced          Abdomen: Soft, nontender, nondistended, no masses or organomegaly     No rebound tenderness noted; bowel sounds normal     Pulsatile aortic mass is not palpable.     No groin adenopathy      Extremities:   2+ radial, brachial pulses bilaterally     2+ pedal pulses palpable bilaterally.     LUE with strong thrill        LAB RESULTS:  Lab Results   Component Value Date    K 3.6 05/10/2017    CREATININE 5.3 (H) 05/10/2017     Lab Results   Component Value Date    WBC 7.21 05/10/2017    HCT 33.4 (L) 05/10/2017     05/10/2017     No results found for: HGBA1C  IMAGING:  Dialysis access duplex 1/16/20: Color flow duplex exam reveals a patent left Brachiobasilic AV fistula. There are accelerated velocities noted just distal to the arterial anastomosis with max velocity of  634cm/s. Volume flow at mid bicep level measures 2953 ml/min.    PRIOR:  Dialysis access duplex June 2017: Imaging reveals a patent Lt brachio-basilic AV fistula. A velocity elevation of 822cm/s is noted near the arterial anastomosis with narrowing, suggestive of a focal hemodynamically significant stenosis.   Volume flow at mid-bicep level measures 629cm.     Vein mapping June 2017:  R basilic 2.9, 3.4mm  L basilic 3.1, 3.6mm     R brachial vein: 3.4 - 3.5 mm  L brachial vein:  3.6mm     Occluded cephalic veins  Basilic veins R 2.2 to 2.3 mm    IMP/PLAN:  40 y.o. female with h/o FSGS, stage V CKD / on HD since June 2013 - had 3 previous failed accesses at outside facility  s/p creation 1st stage L basilic vein / brachial AVF by me on 6/9/17:  comes for f/u with increasing discomfort from L forearm edema  Received KTx Sept 2017 in [facility] - doing well  Re-assured mild L upper arm edema is not limb threatening but she would like to have it ligated  Plan for ligation, possible excision of proximal L 1st stage BVT AVF 11/5/20    Owen Fraser MD FACS  Vascular/Endovascular Surgery

## 2020-10-28 NOTE — H&P (VIEW-ONLY)
Agueda Herrera  10/28/2020  HPI:  Patient is a 40 y.o. female with a h/o FSGS, stage V CKD / on HD since June 2013 - comes for f/u with increasing discomfort from L forearm edema  s/p creation of 1st stage L basilic vein / brachial AVF on 6/9/17. She never ended up needing to use this AVF because she underwent a kidney transplant on 9/17/17 and has not required dialysis since then. She presents today with complaints of pain near her graft site. Pain occurs intermittently without clear aggravating factors. Denies numbness or tingling, no motor deficits.      Has had previous access created at [facility]:  L rc avg, LUE AVG, RUE AVG - Dr Renner/Kierra at [facility]  LUE AVG worked for 3 yrs but required multiple interventions to keep it open  RUE AVG worked for ~ month before it occluded; no thrombectomy done     No MI/stroke  Tobacco use: denies     Nephrologist is Dr Zuniga    Past Medical History:   Diagnosis Date    Dialysis patient     Hypertension     Kidney disease      Past Surgical History:   Procedure Laterality Date    av graft Left     TUBAL LIGATION       No family history on file.  Social History     Socioeconomic History    Marital status: Single     Spouse name: Not on file    Number of children: Not on file    Years of education: Not on file    Highest education level: Not on file   Occupational History    Not on file   Social Needs    Financial resource strain: Not on file    Food insecurity     Worry: Not on file     Inability: Not on file    Transportation needs     Medical: Not on file     Non-medical: Not on file   Tobacco Use    Smoking status: Never Smoker   Substance and Sexual Activity    Alcohol use: No    Drug use: No    Sexual activity: Yes     Partners: Male   Lifestyle    Physical activity     Days per week: Not on file     Minutes per session: Not on file    Stress: Not on file   Relationships    Social connections     Talks on phone: Not on file     Gets  together: Not on file     Attends Restorationist service: Not on file     Active member of club or organization: Not on file     Attends meetings of clubs or organizations: Not on file     Relationship status: Not on file   Other Topics Concern    Not on file   Social History Narrative    Not on file       Current Outpatient Medications:     aspirin (ECOTRIN) 81 MG EC tablet, Take 81 mg by mouth., Disp: , Rfl:     ferrous sulfate (FEOSOL) 325 mg (65 mg iron) Tab tablet, TK 1 T PO ONCE A DAY, Disp: , Rfl:     mycophenolate (MYFORTIC) 360 MG TbEC, , Disp: , Rfl:     vitamin D (VITAMIN D3) 1000 units Tab, Take 1,000 Units by mouth once daily., Disp: , Rfl:     ZORTRESS 0.5 mg tablet, TK 3 TS PO Q 12 H, Disp: , Rfl:     REVIEW OF SYSTEMS:  General: negative; ENT: negative; Allergy and Immunology: negative; Hematological and Lymphatic: Negative; Endocrine: negative; Respiratory: no cough, shortness of breath, or wheezing; Cardiovascular: no chest pain or dyspnea on exertion; Gastrointestinal: no abdominal pain/back, change in bowel habits, or bloody stools; Genito-Urinary: no dysuria, trouble voiding, or hematuria; Musculoskeletal: negative  Neurological: no TIA or stroke symptoms; Psychiatric: no nervousness, anxiety or depression.    PHYSICAL EXAM:   Vitals:    10/28/20 0849   BP: 132/82   Pulse: 84   Temp: 98.8 °F (37.1 °C)       General appearance:  Alert, well-appearing, and in no distress.  Oriented to person, place, and time   Neurological: Normal speech, no focal findings noted; CN II - XII grossly intact           Musculoskeletal: Digits/nail without cyanosis/clubbing.  Normal muscle strength/tone.                 Neck: Supple, no significant adenopathy; thyroid is not enlarged                  No carotid bruits can be auscultated                Chest:  Clear to auscultation, no wheezes, rales or rhonchi, symmetric air entry     No use of accessory muscles             Cardiac: Normal rate and regular rhythm,  S1 and S2 normal; PMI non-displaced          Abdomen: Soft, nontender, nondistended, no masses or organomegaly     No rebound tenderness noted; bowel sounds normal     Pulsatile aortic mass is not palpable.     No groin adenopathy      Extremities:   2+ radial, brachial pulses bilaterally     2+ pedal pulses palpable bilaterally.     LUE with strong thrill        LAB RESULTS:  Lab Results   Component Value Date    K 3.6 05/10/2017    CREATININE 5.3 (H) 05/10/2017     Lab Results   Component Value Date    WBC 7.21 05/10/2017    HCT 33.4 (L) 05/10/2017     05/10/2017     No results found for: HGBA1C  IMAGING:  Dialysis access duplex 1/16/20: Color flow duplex exam reveals a patent left Brachiobasilic AV fistula. There are accelerated velocities noted just distal to the arterial anastomosis with max velocity of  634cm/s. Volume flow at mid bicep level measures 2953 ml/min.    PRIOR:  Dialysis access duplex June 2017: Imaging reveals a patent Lt brachio-basilic AV fistula. A velocity elevation of 822cm/s is noted near the arterial anastomosis with narrowing, suggestive of a focal hemodynamically significant stenosis.   Volume flow at mid-bicep level measures 629cm.     Vein mapping June 2017:  R basilic 2.9, 3.4mm  L basilic 3.1, 3.6mm     R brachial vein: 3.4 - 3.5 mm  L brachial vein:  3.6mm     Occluded cephalic veins  Basilic veins R 2.2 to 2.3 mm    IMP/PLAN:  40 y.o. female with h/o FSGS, stage V CKD / on HD since June 2013 - had 3 previous failed accesses at outside facility  s/p creation 1st stage L basilic vein / brachial AVF by me on 6/9/17:  comes for f/u with increasing discomfort from L forearm edema  Received KTx Sept 2017 in [facility] - doing well  Re-assured mild L upper arm edema is not limb threatening but she would like to have it ligated  Plan for ligation, possible excision of proximal L 1st stage BVT AVF 11/5/20    Owen Fraser MD FACS  Vascular/Endovascular Surgery

## 2020-10-29 ENCOUNTER — DOCUMENTATION ONLY (OUTPATIENT)
Dept: VASCULAR SURGERY | Facility: CLINIC | Age: 40
End: 2020-10-29

## 2020-11-02 ENCOUNTER — LAB VISIT (OUTPATIENT)
Dept: SURGERY | Facility: CLINIC | Age: 40
End: 2020-11-02
Payer: MEDICARE

## 2020-11-02 DIAGNOSIS — Z01.818 PRE-OP EVALUATION: ICD-10-CM

## 2020-11-02 PROCEDURE — U0003 INFECTIOUS AGENT DETECTION BY NUCLEIC ACID (DNA OR RNA); SEVERE ACUTE RESPIRATORY SYNDROME CORONAVIRUS 2 (SARS-COV-2) (CORONAVIRUS DISEASE [COVID-19]), AMPLIFIED PROBE TECHNIQUE, MAKING USE OF HIGH THROUGHPUT TECHNOLOGIES AS DESCRIBED BY CMS-2020-01-R: HCPCS

## 2020-11-03 LAB — SARS-COV-2 RNA RESP QL NAA+PROBE: NOT DETECTED

## 2020-11-04 ENCOUNTER — TELEPHONE (OUTPATIENT)
Dept: VASCULAR SURGERY | Facility: CLINIC | Age: 40
End: 2020-11-04

## 2020-11-04 NOTE — PRE-PROCEDURE INSTRUCTIONS
PRE-OP INSTRUCTIONS:Instructed patient to have nothing by mouth past midnight.It is ok to take AM medications with a few sips of water.Patient instructed to shower the night before surgery as well as the morning of surgery with an antibacterial soap like dial or hibiclens from the neck down.Encouraged patient to wear loose fitting, comfortable clothing .Medication instructions for pm prior to and am of surgery reviewed.Instructed patient to avoid taking vitamins,supplements or ibuprofen the am of surgery.Patient's questions and concerns addressed.      Patient denies any side effects or issues with anesthesia or sedation.     .

## 2020-11-04 NOTE — TELEPHONE ENCOUNTER
Attempted to contact pt with time of arrival for surgery tomorrow but no answer.Left a voice message informing her of time of arrival for surgery tomorrow is 7am at the main campus on [facility],second floor [facility].Also left a message reminding her of NPO after midnight tonight with a call back number [phone] for pt to return call if she has any questions.

## 2020-11-05 ENCOUNTER — ANESTHESIA EVENT (OUTPATIENT)
Dept: SURGERY | Facility: HOSPITAL | Age: 40
End: 2020-11-05
Payer: MEDICARE

## 2020-11-05 ENCOUNTER — HOSPITAL ENCOUNTER (OUTPATIENT)
Facility: HOSPITAL | Age: 40
Discharge: HOME | End: 2020-11-05
Attending: SURGERY | Admitting: SURGERY
Payer: MEDICARE

## 2020-11-05 ENCOUNTER — ANESTHESIA (OUTPATIENT)
Dept: SURGERY | Facility: HOSPITAL | Age: 40
End: 2020-11-05
Payer: MEDICARE

## 2020-11-05 VITALS
BODY MASS INDEX: 27.97 KG/M2 | HEART RATE: 57 BPM | SYSTOLIC BLOOD PRESSURE: 152 MMHG | HEIGHT: 66 IN | TEMPERATURE: 98 F | WEIGHT: 174 LBS | OXYGEN SATURATION: 99 % | DIASTOLIC BLOOD PRESSURE: 98 MMHG | RESPIRATION RATE: 17 BRPM

## 2020-11-05 DIAGNOSIS — N18.6 ESRD (END STAGE RENAL DISEASE): ICD-10-CM

## 2020-11-05 DIAGNOSIS — N18.6 ESRD (END STAGE RENAL DISEASE) ON DIALYSIS: Primary | ICD-10-CM

## 2020-11-05 DIAGNOSIS — Z99.2 ESRD (END STAGE RENAL DISEASE) ON DIALYSIS: Primary | ICD-10-CM

## 2020-11-05 LAB
B-HCG UR QL: NEGATIVE
CTP QC/QA: YES

## 2020-11-05 PROCEDURE — 25000003 PHARM REV CODE 250: Performed by: STUDENT IN AN ORGANIZED HEALTH CARE EDUCATION/TRAINING PROGRAM

## 2020-11-05 PROCEDURE — 37000009 HC ANESTHESIA EA ADD 15 MINS: Performed by: SURGERY

## 2020-11-05 PROCEDURE — 63600175 PHARM REV CODE 636 W HCPCS: Performed by: NURSE ANESTHETIST, CERTIFIED REGISTERED

## 2020-11-05 PROCEDURE — 37607 PR LIGATN ANGIOACCESS AV FISTULA: ICD-10-PCS | Mod: GC,,, | Performed by: SURGERY

## 2020-11-05 PROCEDURE — 25000003 PHARM REV CODE 250: Performed by: SURGERY

## 2020-11-05 PROCEDURE — D9220A PRA ANESTHESIA: Mod: ANES,,, | Performed by: ANESTHESIOLOGY

## 2020-11-05 PROCEDURE — 63600175 PHARM REV CODE 636 W HCPCS: Performed by: ANESTHESIOLOGY

## 2020-11-05 PROCEDURE — D9220A PRA ANESTHESIA: ICD-10-PCS | Mod: CRNA,,, | Performed by: NURSE ANESTHETIST, CERTIFIED REGISTERED

## 2020-11-05 PROCEDURE — 71000044 HC DOSC ROUTINE RECOVERY FIRST HOUR: Performed by: SURGERY

## 2020-11-05 PROCEDURE — 37000008 HC ANESTHESIA 1ST 15 MINUTES: Performed by: SURGERY

## 2020-11-05 PROCEDURE — 37607 LIG/BANDING ANGIOACS AV FSTL: CPT | Mod: GC,,, | Performed by: SURGERY

## 2020-11-05 PROCEDURE — 81025 URINE PREGNANCY TEST: CPT | Performed by: ANESTHESIOLOGY

## 2020-11-05 PROCEDURE — 71000045 HC DOSC ROUTINE RECOVERY EA ADD'L HR: Performed by: SURGERY

## 2020-11-05 PROCEDURE — 25000003 PHARM REV CODE 250: Performed by: NURSE ANESTHETIST, CERTIFIED REGISTERED

## 2020-11-05 PROCEDURE — 63600175 PHARM REV CODE 636 W HCPCS: Performed by: SURGERY

## 2020-11-05 PROCEDURE — D9220A PRA ANESTHESIA: Mod: CRNA,,, | Performed by: NURSE ANESTHETIST, CERTIFIED REGISTERED

## 2020-11-05 PROCEDURE — 63600175 PHARM REV CODE 636 W HCPCS: Performed by: STUDENT IN AN ORGANIZED HEALTH CARE EDUCATION/TRAINING PROGRAM

## 2020-11-05 PROCEDURE — D9220A PRA ANESTHESIA: ICD-10-PCS | Mod: ANES,,, | Performed by: ANESTHESIOLOGY

## 2020-11-05 PROCEDURE — 71000015 HC POSTOP RECOV 1ST HR: Performed by: SURGERY

## 2020-11-05 PROCEDURE — 36000707: Performed by: SURGERY

## 2020-11-05 PROCEDURE — 27201423 OPTIME MED/SURG SUP & DEVICES STERILE SUPPLY: Performed by: SURGERY

## 2020-11-05 PROCEDURE — 36000706: Performed by: SURGERY

## 2020-11-05 RX ORDER — PROPOFOL 10 MG/ML
VIAL (ML) INTRAVENOUS
Status: DISCONTINUED | OUTPATIENT
Start: 2020-11-05 | End: 2020-11-05

## 2020-11-05 RX ORDER — FENTANYL CITRATE 50 UG/ML
INJECTION, SOLUTION INTRAMUSCULAR; INTRAVENOUS
Status: DISCONTINUED | OUTPATIENT
Start: 2020-11-05 | End: 2020-11-05

## 2020-11-05 RX ORDER — HEPARIN SODIUM 1000 [USP'U]/ML
INJECTION, SOLUTION INTRAVENOUS; SUBCUTANEOUS
Status: DISCONTINUED | OUTPATIENT
Start: 2020-11-05 | End: 2020-11-05 | Stop reason: HOSPADM

## 2020-11-05 RX ORDER — HYDROMORPHONE HYDROCHLORIDE 1 MG/ML
0.2 INJECTION, SOLUTION INTRAMUSCULAR; INTRAVENOUS; SUBCUTANEOUS EVERY 5 MIN PRN
Status: DISCONTINUED | OUTPATIENT
Start: 2020-11-05 | End: 2020-11-05 | Stop reason: HOSPADM

## 2020-11-05 RX ORDER — CEFAZOLIN SODIUM 1 G/3ML
2 INJECTION, POWDER, FOR SOLUTION INTRAMUSCULAR; INTRAVENOUS
Status: COMPLETED | OUTPATIENT
Start: 2020-11-05 | End: 2020-11-05

## 2020-11-05 RX ORDER — MIDAZOLAM HYDROCHLORIDE 1 MG/ML
INJECTION, SOLUTION INTRAMUSCULAR; INTRAVENOUS
Status: DISCONTINUED | OUTPATIENT
Start: 2020-11-05 | End: 2020-11-05

## 2020-11-05 RX ORDER — SODIUM CHLORIDE 9 MG/ML
INJECTION, SOLUTION INTRAVENOUS CONTINUOUS
Status: ACTIVE | OUTPATIENT
Start: 2020-11-05

## 2020-11-05 RX ORDER — ACETAMINOPHEN 325 MG/1
650 TABLET ORAL ONCE
Status: COMPLETED | OUTPATIENT
Start: 2020-11-05 | End: 2020-11-05

## 2020-11-05 RX ORDER — SODIUM CHLORIDE 0.9 % (FLUSH) 0.9 %
10 SYRINGE (ML) INJECTION
Status: DISCONTINUED | OUTPATIENT
Start: 2020-11-05 | End: 2020-11-05 | Stop reason: HOSPADM

## 2020-11-05 RX ORDER — KETOCONAZOLE 200 MG/1
TABLET ORAL
COMMUNITY

## 2020-11-05 RX ORDER — ONDANSETRON 2 MG/ML
INJECTION INTRAMUSCULAR; INTRAVENOUS
Status: DISCONTINUED | OUTPATIENT
Start: 2020-11-05 | End: 2020-11-05

## 2020-11-05 RX ORDER — LIDOCAINE HYDROCHLORIDE 10 MG/ML
INJECTION INFILTRATION; PERINEURAL
Status: DISCONTINUED | OUTPATIENT
Start: 2020-11-05 | End: 2020-11-05 | Stop reason: HOSPADM

## 2020-11-05 RX ORDER — KETAMINE HCL IN 0.9 % NACL 50 MG/5 ML
SYRINGE (ML) INTRAVENOUS
Status: DISCONTINUED | OUTPATIENT
Start: 2020-11-05 | End: 2020-11-05

## 2020-11-05 RX ORDER — PROPOFOL 10 MG/ML
VIAL (ML) INTRAVENOUS CONTINUOUS PRN
Status: DISCONTINUED | OUTPATIENT
Start: 2020-11-05 | End: 2020-11-05

## 2020-11-05 RX ORDER — LIDOCAINE HYDROCHLORIDE 20 MG/ML
INJECTION INTRAVENOUS
Status: DISCONTINUED | OUTPATIENT
Start: 2020-11-05 | End: 2020-11-05

## 2020-11-05 RX ADMIN — PROPOFOL 20 MG: 10 INJECTION, EMULSION INTRAVENOUS at 09:11

## 2020-11-05 RX ADMIN — SODIUM CHLORIDE: 0.9 INJECTION, SOLUTION INTRAVENOUS at 08:11

## 2020-11-05 RX ADMIN — FENTANYL CITRATE 50 MCG: 50 INJECTION, SOLUTION INTRAMUSCULAR; INTRAVENOUS at 10:11

## 2020-11-05 RX ADMIN — MIDAZOLAM HYDROCHLORIDE 2 MG: 1 INJECTION, SOLUTION INTRAMUSCULAR; INTRAVENOUS at 08:11

## 2020-11-05 RX ADMIN — Medication 30 MG: at 09:11

## 2020-11-05 RX ADMIN — HYDROMORPHONE HYDROCHLORIDE 0.2 MG: 1 INJECTION, SOLUTION INTRAMUSCULAR; INTRAVENOUS; SUBCUTANEOUS at 11:11

## 2020-11-05 RX ADMIN — LIDOCAINE HYDROCHLORIDE 20 MG: 20 INJECTION, SOLUTION INTRAVENOUS at 09:11

## 2020-11-05 RX ADMIN — PROPOFOL 75 MCG/KG/MIN: 10 INJECTION, EMULSION INTRAVENOUS at 09:11

## 2020-11-05 RX ADMIN — ONDANSETRON 4 MG: 2 INJECTION, SOLUTION INTRAMUSCULAR; INTRAVENOUS at 09:11

## 2020-11-05 RX ADMIN — CEFAZOLIN 2 G: 330 INJECTION, POWDER, FOR SOLUTION INTRAMUSCULAR; INTRAVENOUS at 09:11

## 2020-11-05 RX ADMIN — ACETAMINOPHEN 650 MG: 325 TABLET ORAL at 12:11

## 2020-11-05 RX ADMIN — MEPIVACAINE HYDROCHLORIDE 36 ML: 15 INJECTION, SOLUTION EPIDURAL; INFILTRATION at 09:11

## 2020-11-05 RX ADMIN — FENTANYL CITRATE 50 MCG: 50 INJECTION, SOLUTION INTRAMUSCULAR; INTRAVENOUS at 08:11

## 2020-11-05 RX ADMIN — PROPOFOL 50 MG: 10 INJECTION, EMULSION INTRAVENOUS at 09:11

## 2020-11-05 NOTE — ANESTHESIA POSTPROCEDURE EVALUATION
Anesthesia Post Evaluation    Patient: Agueda Herrera    Procedure(s) Performed: Procedure(s) (LRB):  LIGATION, AV FISTULA (Left)    Final Anesthesia Type: general    Patient location during evaluation: PACU  Patient participation: Yes- Able to Participate  Level of consciousness: awake and alert, awake and oriented  Post-procedure vital signs: reviewed and stable  Pain management: adequate  Airway patency: patent    PONV status at discharge: No PONV  Anesthetic complications: no      Cardiovascular status: blood pressure returned to baseline, stable and hemodynamically stable  Respiratory status: unassisted, spontaneous ventilation and room air  Hydration status: euvolemic  Follow-up not needed.          Vitals Value Taken Time   /98 11/05/20 1217   Temp 36.6 °C (97.9 °F) 11/05/20 1053   Pulse 64 11/05/20 1219   Resp 14 11/05/20 1219   SpO2 100 % 11/05/20 1219   Vitals shown include unvalidated device data.      No case tracking events are documented in the log.      Pain/Jeni Score: Pain Rating Prior to Med Admin: 8 (11/5/2020 11:25 AM)  Jeni Score: 9 (11/5/2020 12:00 PM)

## 2020-11-05 NOTE — PLAN OF CARE
Patient being discharged to home via wheelchair, escorted by her brother. Pt alert and talkative, vitals stable, tolerating PO intake. Discharge instructions (written and verbal) and follow-up information given to patient who verbalized understanding. [facility] contact info provided for additional questions following discharge.    Pt able to independently dress herself using her LUE, pt requested sling anyway. Sling applied to LUE. Currently awaiting arrival of her brother.

## 2020-11-05 NOTE — PLAN OF CARE
"Pt reporting discomfort in LUE r/t to numbness of extremity, states "this is so numb that it hurts and I want this reversed." Regional Anesthesia team notified, advises pt that nerve block cannot be reversed and that sensation will return gradually over the next few hours. Pulses intact, pt able to move hand though cannot overcome gravity. Will continue to monitor.   "

## 2020-11-05 NOTE — OP NOTE
[facility]  Vascular Surgery  Operative Note    SUMMARY     Date of Procedure: 11/5/2020     Procedure: Procedure(s) (LRB):  LIGATION, AV FISTULA (Left) ; Ligation L brachial artery/basilic vein AVF    Surgeon(s) and Role:     * Owen Fraser MD - Primary     * Robbin Irving MD - Resident - Assisting    Pre-Operative Diagnosis: ESRD (end stage renal disease) on dialysis [N18.6, Z99.2]    Post-Operative Diagnosis: Post-Op Diagnosis Codes:     * ESRD (end stage renal disease) on dialysis [N18.6, Z99.2]    Anesthesia: Local MAC    Technical Procedures Used: Ligation of L 1st stage brachial artery/basilic vein AVF    Description of the Findings of the Procedure: After informed consent was obtained, Ms. Herrera was brought to the operating room and placed supine. Her left arm was prepped and draped in sterile fashion. Local MAC with local anesthetic and ketamine was used for adequate sedation and analgesia. The left brachial artery/basilic vein AVF was palpated and confirmed to have good thrill. It was then imaged under ultrasound and distal radial pulse was confirmed to have biphasic Doppler signals. A 6 cm incision was made over the left AVF, and the fistula was dissected through the subcutaneous tissue with care to not puncture the fistula. The anastomoses on the brachial artery and basilic vein were identified. The AVF was then confirmed using ultrasound. The proximal and distal ends of the fistula were clamped. The left radial pulse was found at this time again with biphasic Doppler signals. After confirming a left radial pulse, the fistula was then divided sharply with an 11-blade. The proximal end was then ligated with 3-0 prolene in a two-layer fashion. After ensuring adequate hemostasis, the distal end was ligated using 3-0 prolene in a two-layer fashion. Radial pulses were again confirmed with biphasic Doppler signals. Hemostasis was achieved using electrocautery with irrigation to  confirm hemostasis. The surgical incision was closed in 3 layers using 3-0 Vicryl, 3-0 Vicryl, followed by 4-0 monocryl. Pressure was held to ensure hemostasis. Steri-strips were placed, then covered by 4x4 and tegaderm. The patient was transported to PACU in stable condition.     She will follow up with Dr. Fraser in clinic in 4 weeks. Tylenol can be taken at home for pain control.      Complications: No    Estimated Blood Loss (EBL): 10 mL           Implants: * No implants in log *    Specimens:   Specimen (12h ago, onward)    None                  Condition: Stable    Disposition: PACU - hemodynamically stable.    Attestation: I was present and scrubbed for the entire procedure.

## 2020-11-05 NOTE — ANESTHESIA PREPROCEDURE EVALUATION
11/05/2020  Agueda Herrera is a 40 y.o., female.    Anesthesia Evaluation    I have reviewed the Patient Summary Reports.    I have reviewed the Nursing Notes. I have reviewed the NPO Status.   I have reviewed the Medications.     Review of Systems  Anesthesia Hx:  No problems with previous Anesthesia    Cardiovascular:   Hypertension, well controlled Denies MI.      Pulmonary:  Pulmonary Normal  Denies Asthma.  Denies Shortness of breath.  Denies Sleep Apnea.    Renal/:   Chronic Renal Disease, ESRD, Dialysis Dialyzed T, Th, Sat.     Hepatic/GI:  Hepatic/GI Normal  Denies GERD. Denies Liver Disease.    Neurological:  Neurology Normal Denies TIA.  Denies CVA. Denies Seizures.    Endocrine:   Denies Diabetes. Denies Hypothyroidism. Hyperthyroidism        Physical Exam  General:  Well nourished    Airway/Jaw/Neck:  Airway Findings: Mouth Opening: Normal Tongue: Normal  General Airway Assessment: Adult  Mallampati: II  TM Distance: Normal, at least 6 cm  Jaw/Neck Findings:  Neck ROM: Normal ROM      Dental:  Dental Findings: In tact   Chest/Lungs:  Chest/Lungs Findings: Clear to auscultation, Normal Respiratory Rate     Heart/Vascular:  Heart Findings: Rate: Normal  Rhythm: Regular Rhythm  Sounds: Normal        Mental Status:  Mental Status Findings:  Alert and Oriented         Anesthesia Plan  Type of Anesthesia, risks & benefits discussed:  Anesthesia Type:  MAC  Patient's Preference:   Intra-op Monitoring Plan: standard ASA monitors  Intra-op Monitoring Plan Comments:   Post Op Pain Control Plan: multimodal analgesia  Post Op Pain Control Plan Comments:   Induction:   IV  Beta Blocker:  Patient is not currently on a Beta-Blocker (No further documentation required).       Informed Consent: Patient understands risks and agrees with Anesthesia plan.  Questions answered. Anesthesia consent signed with  patient.  ASA Score: 3     Day of Surgery Review of History & Physical:    H&P update referred to the surgeon.         Ready For Surgery From Anesthesia Perspective.

## 2020-11-05 NOTE — TRANSFER OF CARE
"Anesthesia Transfer of Care Note    Patient: Agueda Herrera    Procedure(s) Performed: Procedure(s) (LRB):  LIGATION, AV FISTULA (Left)    Patient location: PACU    Anesthesia Type: general    Transport from OR: Transported from OR on room air with adequate spontaneous ventilation. Transported from OR on 6-10 L/min O2 by face mask with adequate spontaneous ventilation    Post pain: adequate analgesia    Post assessment: no apparent anesthetic complications and tolerated procedure well    Post vital signs: stable    Level of consciousness: awake    Nausea/Vomiting: no nausea/vomiting    Complications: none    Transfer of care protocol was followed      Last vitals:   Visit Vitals  BP (!) 140/80   Pulse 80   Resp 18   Ht 5' 6" (1.676 m)   Wt 78.9 kg (174 lb)   SpO2 100%   Breastfeeding No   BMI 28.08 kg/m²     "

## 2020-11-05 NOTE — BRIEF OP NOTE
Brief Operative Note  Date: 11/05/2020    Pre-op Diagnosis:  ESRD (end stage renal disease) on dialysis [N18.6, Z99.2]    Post-op Diagnosis:  Same    Procedure(s):  Ligation of L 1st stage brachial artery/basilic vein AVF    Surgeon: Owen Fraser MD FACS    Assistant: Robbin Ivring MD - Resident - Assisting    Anesthesia: Local MAC    Findings/Key Components:  Dilated L AVF; 2+ L radial pulse    EBL: < 10 ml      Specimens (From admission, onward)    None        I attest to being present for the procedure and performing the case.  Owen Fraser MD FACS  Discharge Note  SUMMARY    Admit Date: 11/5/2020    Attending Physician: Owen Fraser MD FACS    Discharge Physician: Owen Fraser MD FACS    Discharge Date: 11/05/2020    Final Diagnosis: ESRD (end stage renal disease) on dialysis [N18.6, Z99.2]    Outcome of Treatment: Successful ligation    Disposition: Home or self-care    Patient Instructions:   Current Discharge Medication List      CONTINUE these medications which have NOT CHANGED    Details   aspirin (ECOTRIN) 81 MG EC tablet Take 81 mg by mouth nightly.       ferrous sulfate (FEOSOL) 325 mg (65 mg iron) Tab tablet Take 325 mg by mouth nightly.       ketoconazole (NIZORAL) 200 mg Tab Take by mouth.      mycophenolate (MYFORTIC) 360 MG TbEC Take 360 mg by mouth every morning.       vitamin D (VITAMIN D3) 1000 units Tab Take 1,000 Units by mouth nightly.       ZORTRESS 0.5 mg tablet TK 3 TS PO Q 12 H             Diet:  Resume pre-operative diet    Activity:  Ad ayaan    Follow-up:  Follow-up in clinic with Dr Fraser within 4-6weeks; please call clinic nurse at

## 2020-11-06 ENCOUNTER — NURSE TRIAGE (OUTPATIENT)
Dept: ADMINISTRATIVE | Facility: CLINIC | Age: 40
End: 2020-11-06

## 2020-11-06 ENCOUNTER — TELEPHONE (OUTPATIENT)
Dept: VASCULAR SURGERY | Facility: CLINIC | Age: 40
End: 2020-11-06

## 2020-11-06 RX ORDER — OXYCODONE AND ACETAMINOPHEN 5; 325 MG/1; MG/1
1 TABLET ORAL EVERY 4 HOURS PRN
Qty: 40 TABLET | Refills: 0 | Status: SHIPPED | OUTPATIENT
Start: 2020-11-06

## 2020-11-06 RX ORDER — OXYCODONE AND ACETAMINOPHEN 5; 325 MG/1; MG/1
1 TABLET ORAL EVERY 6 HOURS PRN
Qty: 10 TABLET | Refills: 0 | Status: SHIPPED | OUTPATIENT
Start: 2020-11-06 | End: 2020-11-06

## 2020-11-06 NOTE — TELEPHONE ENCOUNTER
Patient calling for arm pain after surgery yesterday. Patient would like us to call an on call provider for pain medication. I advised that it is against our protocol to call for pain medication. I stated she could go to the ER, use [facility] but I was not sure of their limitations with prescribe meds and I will send a message to the provider as well.     Patient very upset and states she will report everyone and she is not going to the ER with covid and they should have sent her home with medications. I apolgized for the inconvenience. Advised again on the nurse triage protocols and offered the coupon code for [facility].      Reason for Disposition   Caller requesting a CONTROLLED substance prescription refill (e.g., narcotics, ADHD medicines)    Protocols used: MEDICATION QUESTION CALL-A-AH

## 2020-11-06 NOTE — TELEPHONE ENCOUNTER
Pt called stating she's been up all night and in a lot of excruciating pain in her left arm from surgery yesterday  and is requesting pain med.Dr Fraser notified and prescription sent to pharmacy of pt's choice.Pt notified of prescription called in to the pharmacy.

## (undated) DEVICE — SUT 2-0 VICRYL / SH (J417)

## (undated) DEVICE — SUT SILK 2-0 SH 18IN BLACK

## (undated) DEVICE — COVER LIGHT HANDLE 80/CA

## (undated) DEVICE — BLADE SURG CARBON STEEL SZ11

## (undated) DEVICE — SPONGE DERMACEA 4X4IN 12PLY

## (undated) DEVICE — SPONGE DERMACEA GAUZE 4X4

## (undated) DEVICE — BLADE SURG #15 CARBON STEEL

## (undated) DEVICE — SUT LIGACLIP SMALL XTRA

## (undated) DEVICE — LOOP VESSEL BLUE MAXI

## (undated) DEVICE — HEMOSTAT SURGICEL 4X8IN

## (undated) DEVICE — SEE MEDLINE ITEM 152764

## (undated) DEVICE — SET DECANTER MEDICHOICE

## (undated) DEVICE — DRAPE PLASTIC U 60X72

## (undated) DEVICE — GOWN SURGICAL X-LARGE

## (undated) DEVICE — SUT 3-0 12-18IN SILK

## (undated) DEVICE — CLIP SPRING 6MM

## (undated) DEVICE — SWABSTICK BENZOIN 4 IN

## (undated) DEVICE — SEE MEDLINE ITEM 157173

## (undated) DEVICE — SUT 4-0 12-18IN SILK BLACK

## (undated) DEVICE — APPLICATOR CHLORAPREP ORN 26ML

## (undated) DEVICE — INSERTS STEALTH FIBRA SIZE 1.

## (undated) DEVICE — SUT 2-0 12-18IN SILK

## (undated) DEVICE — SOL NS 1000CC

## (undated) DEVICE — SEE MEDLINE ITEM 153688

## (undated) DEVICE — ADHESIVE DERMABOND ADVANCED

## (undated) DEVICE — DRESSING TRANS 2X2 TEGADERM

## (undated) DEVICE — ELECTRODE REM PLYHSV RETURN 9

## (undated) DEVICE — SUT MCRYL PLUS 4-0 PS2 27IN

## (undated) DEVICE — CANNULA VESSEL

## (undated) DEVICE — SUT PROLENE 5-0 24 C-1 BL

## (undated) DEVICE — SUT MONOCRYL 3-0 SH U/D

## (undated) DEVICE — TRAY MINOR GEN SURG

## (undated) DEVICE — SOL 9P NACL IRR PIC IL

## (undated) DEVICE — DRESSING TELFA STRL 4X3 LF

## (undated) DEVICE — CLOSURE SKIN STERI STRIP 1/4X4

## (undated) DEVICE — SYR ONLY LUER LOCK 20CC

## (undated) DEVICE — SUT 7/0 18IN PROLENE BL MO

## (undated) DEVICE — BOOT SUTURE AID

## (undated) DEVICE — SEE MEDLINE ITEM 146417

## (undated) DEVICE — DRESSING TRANS 4X4 TEGADERM

## (undated) DEVICE — DRESSING TEGADERM 2 3/8 X 2.75

## (undated) DEVICE — SYR SLIP TIP 1CC

## (undated) DEVICE — DRAPE STERI INSTRUMENT 1018

## (undated) DEVICE — SUT 7/0 24IN PROLENE BL MO

## (undated) DEVICE — DRESSING TELFA N ADH 3X8

## (undated) DEVICE — SUT VICRYL 3-0 27 SH

## (undated) DEVICE — SUT VICRYL CTD 2-0 GI 27 SH

## (undated) DEVICE — SUT PROLENE 6-0 24 BV-1

## (undated) DEVICE — TAPE UMBILICAL 1/8X36IN WHITE

## (undated) DEVICE — SPONGE LAP 18X18 PREWASHED

## (undated) DEVICE — NDL HYPO A BEVEL 30X1/2

## (undated) DEVICE — CLIP MED TICALL

## (undated) DEVICE — SPONGE GAUZE 16PLY 4X4